# Patient Record
Sex: FEMALE | Race: WHITE | NOT HISPANIC OR LATINO | Employment: OTHER | URBAN - METROPOLITAN AREA
[De-identification: names, ages, dates, MRNs, and addresses within clinical notes are randomized per-mention and may not be internally consistent; named-entity substitution may affect disease eponyms.]

---

## 2023-04-25 ENCOUNTER — TELEPHONE (OUTPATIENT)
Dept: PRIMARY CARE | Facility: CLINIC | Age: 83
End: 2023-04-25
Payer: MEDICARE

## 2023-04-25 NOTE — TELEPHONE ENCOUNTER
Dr. Baird Pt. Called saying she would like to speak with you, she have some kind of illness. Pt. Contact number is [phone].

## 2023-05-01 PROBLEM — K63.5 COLON POLYP: Status: ACTIVE | Noted: 2023-05-01

## 2023-05-01 PROBLEM — H90.3 BILATERAL SENSORINEURAL HEARING LOSS: Status: ACTIVE | Noted: 2023-05-01

## 2023-05-01 PROBLEM — K80.20 CHOLELITHIASIS: Status: ACTIVE | Noted: 2023-05-01

## 2023-05-01 PROBLEM — H81.10 BENIGN POSITIONAL VERTIGO: Status: ACTIVE | Noted: 2023-05-01

## 2023-05-01 PROBLEM — L84 CALLUS: Status: ACTIVE | Noted: 2023-05-01

## 2023-05-01 PROBLEM — B35.1 ONYCHOMYCOSIS OF TOENAIL: Status: ACTIVE | Noted: 2023-05-01

## 2023-05-01 PROBLEM — S29.019A STRAIN OF THORACIC REGION: Status: ACTIVE | Noted: 2023-05-01

## 2023-05-01 PROBLEM — R73.03 PREDIABETES: Status: ACTIVE | Noted: 2023-05-01

## 2023-05-01 PROBLEM — S90.222A SUBUNGUAL HEMATOMA OF TOE OF LEFT FOOT: Status: ACTIVE | Noted: 2023-05-01

## 2023-05-01 PROBLEM — M62.81 MUSCLE WEAKNESS OF EXTREMITY: Status: ACTIVE | Noted: 2023-05-01

## 2023-05-01 PROBLEM — H91.91 HEARING LOSS IN RIGHT EAR: Status: ACTIVE | Noted: 2023-05-01

## 2023-05-01 PROBLEM — K57.90 DIVERTICULOSIS: Status: ACTIVE | Noted: 2023-05-01

## 2023-05-01 PROBLEM — I10 HYPERTENSION: Status: ACTIVE | Noted: 2023-05-01

## 2023-05-01 PROBLEM — L50.1 URTICARIA, IDIOPATHIC: Status: ACTIVE | Noted: 2023-05-01

## 2023-05-01 PROBLEM — G62.9 PERIPHERAL NEUROPATHY: Status: ACTIVE | Noted: 2023-05-01

## 2023-05-01 PROBLEM — G89.29 CHRONIC BACK PAIN: Status: ACTIVE | Noted: 2023-05-01

## 2023-05-01 PROBLEM — N95.1 VAGINAL DRYNESS, MENOPAUSAL: Status: ACTIVE | Noted: 2023-05-01

## 2023-05-01 PROBLEM — M20.62 DEFORMITY OF TOE OF LEFT FOOT: Status: ACTIVE | Noted: 2023-05-01

## 2023-05-01 PROBLEM — E03.9 HYPOTHYROIDISM: Status: ACTIVE | Noted: 2023-05-01

## 2023-05-01 PROBLEM — M54.9 CHRONIC BACK PAIN: Status: ACTIVE | Noted: 2023-05-01

## 2023-05-01 PROBLEM — M25.512 LEFT SHOULDER PAIN: Status: ACTIVE | Noted: 2023-05-01

## 2023-05-01 PROBLEM — E78.5 HYPERLIPIDEMIA: Status: ACTIVE | Noted: 2023-05-01

## 2023-05-01 PROBLEM — K82.8 GALLBLADDER SLUDGE: Status: ACTIVE | Noted: 2023-05-01

## 2023-05-01 PROBLEM — I25.10 ATHEROSCLEROTIC HEART DISEASE OF NATIVE CORONARY ARTERY WITHOUT ANGINA PECTORIS: Status: ACTIVE | Noted: 2023-05-01

## 2023-05-01 RX ORDER — AMLODIPINE BESYLATE 5 MG/1
TABLET ORAL
COMMUNITY
Start: 2022-10-31 | End: 2023-05-11 | Stop reason: ALTCHOICE

## 2023-05-01 RX ORDER — POTASSIUM CHLORIDE 1500 MG/1
1 TABLET, EXTENDED RELEASE ORAL DAILY
COMMUNITY
End: 2023-05-11 | Stop reason: ALTCHOICE

## 2023-05-01 RX ORDER — LOVASTATIN 40 MG/1
1 TABLET ORAL DAILY
COMMUNITY
Start: 2011-06-09 | End: 2023-11-14 | Stop reason: SDUPTHER

## 2023-05-01 RX ORDER — CETIRIZINE HYDROCHLORIDE 10 MG/1
1 TABLET ORAL DAILY
COMMUNITY
End: 2023-05-11 | Stop reason: ALTCHOICE

## 2023-05-01 RX ORDER — CHOLECALCIFEROL (VITAMIN D3) 25 MCG
1 TABLET ORAL DAILY
COMMUNITY
Start: 2016-12-14 | End: 2023-05-11 | Stop reason: ALTCHOICE

## 2023-05-01 RX ORDER — LYSINE HCL 500 MG
TABLET ORAL
COMMUNITY
Start: 2018-04-10 | End: 2023-05-11 | Stop reason: ALTCHOICE

## 2023-05-01 RX ORDER — POTASSIUM CHLORIDE 20 MEQ/1
1 TABLET, EXTENDED RELEASE ORAL DAILY
COMMUNITY
Start: 2016-11-23 | End: 2023-05-11 | Stop reason: ALTCHOICE

## 2023-05-01 RX ORDER — TRIAMCINOLONE ACETONIDE 1 MG/G
CREAM TOPICAL 2 TIMES DAILY PRN
COMMUNITY
Start: 2021-02-09

## 2023-05-01 RX ORDER — FAMOTIDINE 20 MG/1
TABLET, FILM COATED ORAL
COMMUNITY
Start: 2021-02-08 | End: 2023-05-11 | Stop reason: ALTCHOICE

## 2023-05-01 RX ORDER — ASCORBIC ACID 250 MG
1 TABLET ORAL DAILY
COMMUNITY
Start: 2017-07-07 | End: 2023-05-11 | Stop reason: ALTCHOICE

## 2023-05-01 RX ORDER — TRIAMTERENE AND HYDROCHLOROTHIAZIDE 37.5; 25 MG/1; MG/1
1 CAPSULE ORAL DAILY
COMMUNITY
Start: 2011-06-09 | End: 2023-05-11 | Stop reason: ALTCHOICE

## 2023-05-01 RX ORDER — CICLOPIROX 80 MG/ML
SOLUTION TOPICAL
COMMUNITY
Start: 2022-09-12 | End: 2023-11-27 | Stop reason: WASHOUT

## 2023-05-01 RX ORDER — LORATADINE 10 MG/1
1 TABLET ORAL DAILY
COMMUNITY
Start: 2021-02-08 | End: 2023-05-11 | Stop reason: ALTCHOICE

## 2023-05-01 RX ORDER — LEVOTHYROXINE SODIUM 100 UG/1
1 TABLET ORAL DAILY
COMMUNITY
Start: 2011-06-09 | End: 2023-11-14 | Stop reason: SDUPTHER

## 2023-05-01 RX ORDER — PROPRANOLOL HYDROCHLORIDE 40 MG/1
TABLET ORAL 4 TIMES DAILY
COMMUNITY
Start: 2012-05-16

## 2023-05-01 RX ORDER — BLOOD SUGAR DIAGNOSTIC
STRIP MISCELLANEOUS
COMMUNITY
Start: 2013-11-26 | End: 2023-05-11 | Stop reason: ALTCHOICE

## 2023-05-01 RX ORDER — METFORMIN HYDROCHLORIDE 500 MG/1
1500 TABLET, EXTENDED RELEASE ORAL
COMMUNITY
Start: 2017-09-27 | End: 2023-05-19 | Stop reason: SDUPTHER

## 2023-05-01 RX ORDER — BLOOD SUGAR DIAGNOSTIC
STRIP MISCELLANEOUS
COMMUNITY
Start: 2019-11-19

## 2023-05-01 RX ORDER — CONJUGATED ESTROGENS 0.62 MG/G
CREAM VAGINAL
COMMUNITY
Start: 2012-12-06 | End: 2024-02-08 | Stop reason: SDUPTHER

## 2023-05-11 ENCOUNTER — OFFICE VISIT (OUTPATIENT)
Dept: PRIMARY CARE | Facility: CLINIC | Age: 83
End: 2023-05-11
Payer: MEDICARE

## 2023-05-11 VITALS
WEIGHT: 159.4 LBS | BODY MASS INDEX: 29.33 KG/M2 | TEMPERATURE: 97.3 F | HEART RATE: 60 BPM | OXYGEN SATURATION: 97 % | HEIGHT: 62 IN | RESPIRATION RATE: 15 BRPM | DIASTOLIC BLOOD PRESSURE: 76 MMHG | SYSTOLIC BLOOD PRESSURE: 140 MMHG

## 2023-05-11 DIAGNOSIS — H81.10 BENIGN PAROXYSMAL POSITIONAL VERTIGO, UNSPECIFIED LATERALITY: ICD-10-CM

## 2023-05-11 DIAGNOSIS — I10 HYPERTENSION, UNSPECIFIED TYPE: ICD-10-CM

## 2023-05-11 DIAGNOSIS — M62.81 MUSCLE WEAKNESS OF EXTREMITY: ICD-10-CM

## 2023-05-11 DIAGNOSIS — E03.9 HYPOTHYROIDISM, UNSPECIFIED TYPE: ICD-10-CM

## 2023-05-11 DIAGNOSIS — R73.03 PREDIABETES: Primary | ICD-10-CM

## 2023-05-11 DIAGNOSIS — K80.20 CALCULUS OF GALLBLADDER WITHOUT CHOLECYSTITIS WITHOUT OBSTRUCTION: ICD-10-CM

## 2023-05-11 PROBLEM — G89.29 CHRONIC BACK PAIN: Status: RESOLVED | Noted: 2023-05-01 | Resolved: 2023-05-11

## 2023-05-11 PROBLEM — S29.019A STRAIN OF THORACIC REGION: Status: RESOLVED | Noted: 2023-05-01 | Resolved: 2023-05-11

## 2023-05-11 PROBLEM — M54.9 CHRONIC BACK PAIN: Status: RESOLVED | Noted: 2023-05-01 | Resolved: 2023-05-11

## 2023-05-11 PROBLEM — S90.222A SUBUNGUAL HEMATOMA OF TOE OF LEFT FOOT: Status: RESOLVED | Noted: 2023-05-01 | Resolved: 2023-05-11

## 2023-05-11 PROCEDURE — 80061 LIPID PANEL: CPT | Performed by: PEDIATRICS

## 2023-05-11 PROCEDURE — 3077F SYST BP >= 140 MM HG: CPT | Performed by: PEDIATRICS

## 2023-05-11 PROCEDURE — 83036 HEMOGLOBIN GLYCOSYLATED A1C: CPT | Performed by: PEDIATRICS

## 2023-05-11 PROCEDURE — 1159F MED LIST DOCD IN RCRD: CPT | Performed by: PEDIATRICS

## 2023-05-11 PROCEDURE — 1160F RVW MEDS BY RX/DR IN RCRD: CPT | Performed by: PEDIATRICS

## 2023-05-11 PROCEDURE — 99214 OFFICE O/P EST MOD 30 MIN: CPT | Performed by: PEDIATRICS

## 2023-05-11 PROCEDURE — 84443 ASSAY THYROID STIM HORMONE: CPT | Performed by: PEDIATRICS

## 2023-05-11 PROCEDURE — 3078F DIAST BP <80 MM HG: CPT | Performed by: PEDIATRICS

## 2023-05-11 PROCEDURE — 1036F TOBACCO NON-USER: CPT | Performed by: PEDIATRICS

## 2023-05-11 PROCEDURE — 80048 BASIC METABOLIC PNL TOTAL CA: CPT | Performed by: PEDIATRICS

## 2023-05-11 ASSESSMENT — PATIENT HEALTH QUESTIONNAIRE - PHQ9
1. LITTLE INTEREST OR PLEASURE IN DOING THINGS: NOT AT ALL
2. FEELING DOWN, DEPRESSED OR HOPELESS: NOT AT ALL
SUM OF ALL RESPONSES TO PHQ9 QUESTIONS 1 AND 2: 0

## 2023-05-11 ASSESSMENT — PAIN SCALES - GENERAL: PAINLEVEL: 0-NO PAIN

## 2023-05-11 NOTE — PROGRESS NOTES
"Subjective   Patient ID: Barbara Srinivasan is a 82 y.o. female who presents for Annual Exam.    HPI   Dexa normal in 2021; no more mammograms;  Patient has been feeling well; walks with her cane.  Review of Systems  No CP or SOB  Objective   /76 (BP Location: Left arm, Patient Position: Sitting, BP Cuff Size: Adult)   Pulse 60   Temp 36.3 °C (97.3 °F) (Temporal)   Resp 15   Ht 1.575 m (5' 2\")   Wt 72.3 kg (159 lb 6.4 oz)   SpO2 97%   BMI 29.15 kg/m²     Physical Exam  Vitals reviewed.   Constitutional:       General: She is not in acute distress.  HENT:      Head: Normocephalic.      Right Ear: Tympanic membrane normal.      Left Ear: Tympanic membrane normal.      Nose: Nose normal.      Mouth/Throat:      Pharynx: Oropharynx is clear.   Cardiovascular:      Rate and Rhythm: Normal rate and regular rhythm.      Heart sounds: Normal heart sounds.   Pulmonary:      Breath sounds: Normal breath sounds.   Abdominal:      Palpations: Abdomen is soft.      Tenderness: There is no abdominal tenderness.   Musculoskeletal:         General: No tenderness.      Comments: Walks with cane   Skin:     Findings: No rash.   Neurological:      General: No focal deficit present.      Mental Status: She is alert.   Psychiatric:         Mood and Affect: Mood normal.         Assessment/Plan   Problem List Items Addressed This Visit          Circulatory    Hypertension    Current Assessment & Plan     Well controlled on Propranolol            Digestive    Cholelithiasis    Current Assessment & Plan     No pain            Musculoskeletal    Muscle weakness of extremity    Relevant Orders    Disability Placard       Endocrine/Metabolic    Hypothyroidism    Relevant Orders    Lipid Panel    Thyroid Stimulating Hormone    Prediabetes - Primary    Current Assessment & Plan     Checks sugars weekly; readings are 123, 111, 112, 117, 118, 104, 103, 107, 101, 108, 103, 99, 115         Relevant Orders    Hemoglobin A1C    Basic Metabolic " Panel       Other    Benign positional vertigo    Current Assessment & Plan     Mild if she turns suddenly

## 2023-05-15 ENCOUNTER — TELEPHONE (OUTPATIENT)
Dept: PRIMARY CARE | Facility: CLINIC | Age: 83
End: 2023-05-15
Payer: MEDICARE

## 2023-05-15 DIAGNOSIS — R94.4 DECREASED GFR: Primary | ICD-10-CM

## 2023-05-15 NOTE — TELEPHONE ENCOUNTER
----- Message from Celina Baird MD sent at 5/15/2023  9:37 AM EDT -----  Her kidney tests were a bit off; needs to drink more water ( 8 glasses a day) and repeat in a week; Thyroid test is borderline but if she is feeling well on current dose, I won't change it.

## 2023-05-15 NOTE — TELEPHONE ENCOUNTER
Unable to reach patient  Barbara Srinivasan by phone left voice mail message for Patient to call [phone] to retrieve Dr. Baird message.

## 2023-05-19 DIAGNOSIS — R73.03 PREDIABETES: Primary | ICD-10-CM

## 2023-05-20 RX ORDER — METFORMIN HYDROCHLORIDE 500 MG/1
1500 TABLET, EXTENDED RELEASE ORAL
Qty: 90 TABLET | Refills: 1 | Status: SHIPPED | OUTPATIENT
Start: 2023-05-20 | End: 2023-07-29

## 2023-05-24 ENCOUNTER — TELEPHONE (OUTPATIENT)
Dept: PRIMARY CARE | Facility: CLINIC | Age: 83
End: 2023-05-24
Payer: MEDICARE

## 2023-07-27 DIAGNOSIS — R73.03 PREDIABETES: ICD-10-CM

## 2023-07-29 RX ORDER — METFORMIN HYDROCHLORIDE 500 MG/1
TABLET, EXTENDED RELEASE ORAL
Qty: 180 TABLET | Refills: 1 | Status: SHIPPED | OUTPATIENT
Start: 2023-07-29 | End: 2023-10-09

## 2023-08-08 ENCOUNTER — TELEPHONE (OUTPATIENT)
Dept: PRIMARY CARE | Facility: CLINIC | Age: 83
End: 2023-08-08
Payer: MEDICARE

## 2023-08-09 NOTE — TELEPHONE ENCOUNTER
Has been eating a lot of cherries; on recent CMP, GFR was fine but glucose went up to 120. Will back off of the cherries

## 2023-10-08 DIAGNOSIS — R73.03 PREDIABETES: ICD-10-CM

## 2023-10-09 RX ORDER — METFORMIN HYDROCHLORIDE 500 MG/1
TABLET, EXTENDED RELEASE ORAL
Qty: 270 TABLET | Refills: 3 | Status: SHIPPED | OUTPATIENT
Start: 2023-10-09 | End: 2024-02-08 | Stop reason: SDUPTHER

## 2023-11-14 ENCOUNTER — TELEPHONE (OUTPATIENT)
Dept: PRIMARY CARE | Facility: CLINIC | Age: 83
End: 2023-11-14
Payer: MEDICARE

## 2023-11-14 DIAGNOSIS — E03.9 HYPOTHYROIDISM, UNSPECIFIED TYPE: ICD-10-CM

## 2023-11-14 DIAGNOSIS — E78.5 HYPERLIPIDEMIA, UNSPECIFIED HYPERLIPIDEMIA TYPE: Primary | ICD-10-CM

## 2023-11-14 DIAGNOSIS — I10 HYPERTENSION, UNSPECIFIED TYPE: ICD-10-CM

## 2023-11-14 RX ORDER — LOVASTATIN 40 MG/1
40 TABLET ORAL DAILY
Qty: 90 TABLET | Refills: 1 | Status: SHIPPED | OUTPATIENT
Start: 2023-11-14 | End: 2024-02-08 | Stop reason: SDUPTHER

## 2023-11-14 RX ORDER — TRIAMTERENE AND HYDROCHLOROTHIAZIDE 37.5; 25 MG/1; MG/1
1 CAPSULE ORAL DAILY
Qty: 90 CAPSULE | Refills: 1 | Status: SHIPPED | OUTPATIENT
Start: 2023-11-14 | End: 2024-02-08 | Stop reason: SDUPTHER

## 2023-11-14 RX ORDER — POTASSIUM CHLORIDE 20 MEQ/1
20 TABLET, EXTENDED RELEASE ORAL DAILY
COMMUNITY
Start: 2023-08-03 | End: 2023-11-14 | Stop reason: SDUPTHER

## 2023-11-14 RX ORDER — LEVOTHYROXINE SODIUM 100 UG/1
100 TABLET ORAL DAILY
Qty: 90 TABLET | Refills: 1 | Status: SHIPPED | OUTPATIENT
Start: 2023-11-14 | End: 2024-02-08 | Stop reason: SDUPTHER

## 2023-11-14 RX ORDER — POTASSIUM CHLORIDE 20 MEQ/1
20 TABLET, EXTENDED RELEASE ORAL DAILY
Qty: 90 TABLET | Refills: 1 | Status: SHIPPED | OUTPATIENT
Start: 2023-11-14 | End: 2024-02-08 | Stop reason: SDUPTHER

## 2023-11-14 RX ORDER — TRIAMTERENE AND HYDROCHLOROTHIAZIDE 37.5; 25 MG/1; MG/1
1 CAPSULE ORAL DAILY
COMMUNITY
Start: 2023-08-03 | End: 2023-11-14 | Stop reason: SDUPTHER

## 2023-11-27 ENCOUNTER — OFFICE VISIT (OUTPATIENT)
Dept: PRIMARY CARE | Facility: CLINIC | Age: 83
End: 2023-11-27
Payer: MEDICARE

## 2023-11-27 VITALS
BODY MASS INDEX: 30 KG/M2 | TEMPERATURE: 98 F | WEIGHT: 164 LBS | SYSTOLIC BLOOD PRESSURE: 146 MMHG | HEART RATE: 63 BPM | DIASTOLIC BLOOD PRESSURE: 71 MMHG

## 2023-11-27 DIAGNOSIS — B35.1 ONYCHOMYCOSIS OF TOENAIL: ICD-10-CM

## 2023-11-27 DIAGNOSIS — I10 HYPERTENSION, UNSPECIFIED TYPE: ICD-10-CM

## 2023-11-27 DIAGNOSIS — K80.20 CALCULUS OF GALLBLADDER WITHOUT CHOLECYSTITIS WITHOUT OBSTRUCTION: ICD-10-CM

## 2023-11-27 DIAGNOSIS — R73.03 PREDIABETES: ICD-10-CM

## 2023-11-27 DIAGNOSIS — Z00.00 MEDICARE ANNUAL WELLNESS VISIT, SUBSEQUENT: Primary | ICD-10-CM

## 2023-11-27 DIAGNOSIS — G89.29 CHRONIC LEFT SHOULDER PAIN: ICD-10-CM

## 2023-11-27 DIAGNOSIS — E03.9 HYPOTHYROIDISM, UNSPECIFIED TYPE: ICD-10-CM

## 2023-11-27 DIAGNOSIS — M25.512 CHRONIC LEFT SHOULDER PAIN: ICD-10-CM

## 2023-11-27 DIAGNOSIS — G62.9 PERIPHERAL POLYNEUROPATHY: ICD-10-CM

## 2023-11-27 DIAGNOSIS — I25.10 ATHEROSCLEROSIS OF NATIVE CORONARY ARTERY OF NATIVE HEART WITHOUT ANGINA PECTORIS: ICD-10-CM

## 2023-11-27 DIAGNOSIS — L50.1 URTICARIA, IDIOPATHIC: ICD-10-CM

## 2023-11-27 DIAGNOSIS — E78.5 HYPERLIPIDEMIA, UNSPECIFIED HYPERLIPIDEMIA TYPE: ICD-10-CM

## 2023-11-27 DIAGNOSIS — N95.1 VAGINAL DRYNESS, MENOPAUSAL: ICD-10-CM

## 2023-11-27 PROBLEM — M62.81 MUSCLE WEAKNESS OF EXTREMITY: Status: RESOLVED | Noted: 2023-05-01 | Resolved: 2023-11-27

## 2023-11-27 PROCEDURE — 3078F DIAST BP <80 MM HG: CPT | Performed by: PEDIATRICS

## 2023-11-27 PROCEDURE — 1170F FXNL STATUS ASSESSED: CPT | Performed by: PEDIATRICS

## 2023-11-27 PROCEDURE — 99213 OFFICE O/P EST LOW 20 MIN: CPT | Performed by: PEDIATRICS

## 2023-11-27 PROCEDURE — G0439 PPPS, SUBSEQ VISIT: HCPCS | Performed by: PEDIATRICS

## 2023-11-27 PROCEDURE — 1159F MED LIST DOCD IN RCRD: CPT | Performed by: PEDIATRICS

## 2023-11-27 PROCEDURE — 3077F SYST BP >= 140 MM HG: CPT | Performed by: PEDIATRICS

## 2023-11-27 PROCEDURE — 1126F AMNT PAIN NOTED NONE PRSNT: CPT | Performed by: PEDIATRICS

## 2023-11-27 PROCEDURE — 1160F RVW MEDS BY RX/DR IN RCRD: CPT | Performed by: PEDIATRICS

## 2023-11-27 PROCEDURE — 1036F TOBACCO NON-USER: CPT | Performed by: PEDIATRICS

## 2023-11-27 RX ORDER — AMLODIPINE BESYLATE 5 MG/1
5 TABLET ORAL DAILY
COMMUNITY
End: 2024-05-28 | Stop reason: ALTCHOICE

## 2023-11-27 RX ORDER — LORATADINE 10 MG/1
10 TABLET ORAL DAILY
COMMUNITY

## 2023-11-27 RX ORDER — CICLOPIROX 80 MG/ML
SOLUTION TOPICAL
Qty: 6.6 ML | Refills: 2 | OUTPATIENT
Start: 2023-11-27 | End: 2024-02-08 | Stop reason: SDUPTHER

## 2023-11-27 RX ORDER — FAMOTIDINE 10 MG/1
10 TABLET ORAL DAILY
COMMUNITY

## 2023-11-27 RX ORDER — CALCIUM CARBONATE/VITAMIN D3 600 MG-20
TABLET,CHEWABLE ORAL
COMMUNITY

## 2023-11-27 ASSESSMENT — ACTIVITIES OF DAILY LIVING (ADL)
DOING_HOUSEWORK: INDEPENDENT
MANAGING_FINANCES: INDEPENDENT
DRESSING: INDEPENDENT
BATHING: INDEPENDENT
GROCERY_SHOPPING: INDEPENDENT
TAKING_MEDICATION: INDEPENDENT

## 2023-11-27 ASSESSMENT — PATIENT HEALTH QUESTIONNAIRE - PHQ9
2. FEELING DOWN, DEPRESSED OR HOPELESS: NOT AT ALL
1. LITTLE INTEREST OR PLEASURE IN DOING THINGS: NOT AT ALL
SUM OF ALL RESPONSES TO PHQ9 QUESTIONS 1 AND 2: 0

## 2023-11-27 NOTE — PROGRESS NOTES
Subjective   Patient ID: Barbara Srinivasan is a 83 y.o. female who presents for Medicare Wellness    HPI     Sugars in morning weekly: 92, 110, 96, 120, 100, 105, 106, 105, 111, 104, 115, 119, 103, 112, 110  BP at home weekly: 128/63, 122/56, 135/64, 125/60 130/64, 124/47, 120/52    Dexa normal in 2021; no more mammograms      Review of Systems    Objective   /71   Pulse 63   Temp 36.7 °C (98 °F)   Wt 74.4 kg (164 lb)   BMI 30.00 kg/m²     Physical Exam  Lungs clear  Heart RRR  No edema  Assessment/Plan   Problem List Items Addressed This Visit             ICD-10-CM    Atherosclerotic heart disease of native coronary artery without angina pectoris I25.10     Follows with Dr. Grayson         Relevant Medications    amLODIPine (Norvasc) 5 mg tablet    Cholelithiasis K80.20     Asymptomatic          Hyperlipidemia E78.5     Lipids in August showed triglyceride of 179 otherwise wnl, continue lovastatin 40 mg         Hypertension I10     Well controlled at home, continue amlodipine 5 mg, propranolol 40 mg and dyazide 37.5-25 mg         Hypothyroidism E03.9     Borderline in may, feels she has good energy. Continue 100 synthroid          Left shoulder pain M25.512     Mild, manages with tylenol as needed         Onychomycosis of toenail B35.1     Getting better with penlac         Relevant Medications    ciclopirox (Penlac) 8 % solution    Peripheral neuropathy G62.9     asymptomatic         Prediabetes R73.03     A1c 5.7 in may, continue metformin         RESOLVED: Urticaria, idiopathic L50.1    Vaginal dryness, menopausal N95.1     Continue topical          Other Visit Diagnoses         Codes    Medicare annual wellness visit, subsequent    -  Primary Z00.00

## 2023-11-30 ENCOUNTER — CLINICAL SUPPORT (OUTPATIENT)
Dept: PRIMARY CARE | Facility: CLINIC | Age: 83
End: 2023-11-30
Payer: MEDICARE

## 2023-11-30 PROCEDURE — G0008 ADMIN INFLUENZA VIRUS VAC: HCPCS | Performed by: PEDIATRICS

## 2023-11-30 PROCEDURE — 90662 IIV NO PRSV INCREASED AG IM: CPT | Performed by: PEDIATRICS

## 2023-12-13 ENCOUNTER — TELEPHONE (OUTPATIENT)
Dept: PRIMARY CARE | Facility: CLINIC | Age: 83
End: 2023-12-13
Payer: MEDICARE

## 2023-12-18 ENCOUNTER — TELEPHONE (OUTPATIENT)
Dept: PRIMARY CARE | Facility: CLINIC | Age: 83
End: 2023-12-18
Payer: MEDICARE

## 2023-12-19 ENCOUNTER — OFFICE VISIT (OUTPATIENT)
Dept: PRIMARY CARE | Facility: CLINIC | Age: 83
End: 2023-12-19
Payer: MEDICARE

## 2023-12-19 VITALS
WEIGHT: 161 LBS | DIASTOLIC BLOOD PRESSURE: 69 MMHG | SYSTOLIC BLOOD PRESSURE: 135 MMHG | BODY MASS INDEX: 29.45 KG/M2 | HEART RATE: 67 BPM | TEMPERATURE: 97.1 F

## 2023-12-19 DIAGNOSIS — R09.82 POSTNASAL DRIP: Primary | ICD-10-CM

## 2023-12-19 DIAGNOSIS — J01.90 ACUTE NON-RECURRENT SINUSITIS, UNSPECIFIED LOCATION: ICD-10-CM

## 2023-12-19 PROBLEM — J32.9 SINUSITIS: Status: ACTIVE | Noted: 2023-12-19

## 2023-12-19 PROCEDURE — 1126F AMNT PAIN NOTED NONE PRSNT: CPT | Performed by: PEDIATRICS

## 2023-12-19 PROCEDURE — 99213 OFFICE O/P EST LOW 20 MIN: CPT | Performed by: PEDIATRICS

## 2023-12-19 PROCEDURE — 1159F MED LIST DOCD IN RCRD: CPT | Performed by: PEDIATRICS

## 2023-12-19 PROCEDURE — 1160F RVW MEDS BY RX/DR IN RCRD: CPT | Performed by: PEDIATRICS

## 2023-12-19 PROCEDURE — 3075F SYST BP GE 130 - 139MM HG: CPT | Performed by: PEDIATRICS

## 2023-12-19 PROCEDURE — 3078F DIAST BP <80 MM HG: CPT | Performed by: PEDIATRICS

## 2023-12-19 PROCEDURE — 1036F TOBACCO NON-USER: CPT | Performed by: PEDIATRICS

## 2023-12-19 RX ORDER — AMOXICILLIN 875 MG/1
875 TABLET, FILM COATED ORAL 2 TIMES DAILY
Qty: 20 TABLET | Refills: 0 | Status: SHIPPED | OUTPATIENT
Start: 2023-12-19 | End: 2023-12-29

## 2023-12-19 NOTE — PROGRESS NOTES
Subjective   Patient ID: Barbara Srinivasan is a 83 y.o. female who presents for cough    HPI   Patient has had cough for 2 weeks at night productive of phlegm; does not know what color.   Review of Systems    Objective   /69 (BP Location: Left arm)   Pulse 67   Temp 36.2 °C (97.1 °F)   Wt 73 kg (161 lb)   BMI 29.45 kg/m²     Physical Exam  Nares red  No adenopathy  Lungs clear  Heart RRR  Assessment/Plan   Problem List Items Addressed This Visit             ICD-10-CM    Postnasal drip - Primary R09.82    Sinusitis J32.9    Relevant Medications    amoxicillin (Amoxil) 875 mg tablet    dextromethorphan-guaifenesin (Mucinex DM)  mg 12 hr tablet

## 2024-02-08 ENCOUNTER — OFFICE VISIT (OUTPATIENT)
Dept: PRIMARY CARE | Facility: CLINIC | Age: 84
End: 2024-02-08
Payer: MEDICARE

## 2024-02-08 VITALS
SYSTOLIC BLOOD PRESSURE: 130 MMHG | BODY MASS INDEX: 29.08 KG/M2 | DIASTOLIC BLOOD PRESSURE: 68 MMHG | HEART RATE: 70 BPM | OXYGEN SATURATION: 96 % | TEMPERATURE: 98 F | WEIGHT: 159 LBS

## 2024-02-08 DIAGNOSIS — M25.512 CHRONIC LEFT SHOULDER PAIN: ICD-10-CM

## 2024-02-08 DIAGNOSIS — I10 HYPERTENSION, UNSPECIFIED TYPE: ICD-10-CM

## 2024-02-08 DIAGNOSIS — B35.1 ONYCHOMYCOSIS OF TOENAIL: ICD-10-CM

## 2024-02-08 DIAGNOSIS — R09.82 POSTNASAL DRIP: ICD-10-CM

## 2024-02-08 DIAGNOSIS — E03.9 HYPOTHYROIDISM, UNSPECIFIED TYPE: ICD-10-CM

## 2024-02-08 DIAGNOSIS — N95.1 VAGINAL DRYNESS, MENOPAUSAL: ICD-10-CM

## 2024-02-08 DIAGNOSIS — K80.20 CALCULUS OF GALLBLADDER WITHOUT CHOLECYSTITIS WITHOUT OBSTRUCTION: ICD-10-CM

## 2024-02-08 DIAGNOSIS — E78.5 HYPERLIPIDEMIA, UNSPECIFIED HYPERLIPIDEMIA TYPE: Primary | ICD-10-CM

## 2024-02-08 DIAGNOSIS — N95.2 ATROPHIC VAGINITIS: ICD-10-CM

## 2024-02-08 DIAGNOSIS — R73.03 PREDIABETES: ICD-10-CM

## 2024-02-08 DIAGNOSIS — I25.10 ATHEROSCLEROSIS OF NATIVE CORONARY ARTERY OF NATIVE HEART WITHOUT ANGINA PECTORIS: ICD-10-CM

## 2024-02-08 DIAGNOSIS — G89.29 CHRONIC LEFT SHOULDER PAIN: ICD-10-CM

## 2024-02-08 DIAGNOSIS — H81.10 BENIGN PAROXYSMAL POSITIONAL VERTIGO, UNSPECIFIED LATERALITY: ICD-10-CM

## 2024-02-08 PROCEDURE — 1126F AMNT PAIN NOTED NONE PRSNT: CPT | Performed by: PEDIATRICS

## 2024-02-08 PROCEDURE — 1036F TOBACCO NON-USER: CPT | Performed by: PEDIATRICS

## 2024-02-08 PROCEDURE — 99212 OFFICE O/P EST SF 10 MIN: CPT | Performed by: PEDIATRICS

## 2024-02-08 PROCEDURE — 3078F DIAST BP <80 MM HG: CPT | Performed by: PEDIATRICS

## 2024-02-08 PROCEDURE — 3075F SYST BP GE 130 - 139MM HG: CPT | Performed by: PEDIATRICS

## 2024-02-08 RX ORDER — TRIAMTERENE AND HYDROCHLOROTHIAZIDE 37.5; 25 MG/1; MG/1
1 CAPSULE ORAL DAILY
Qty: 90 CAPSULE | Refills: 2 | Status: SHIPPED | OUTPATIENT
Start: 2024-02-08

## 2024-02-08 RX ORDER — CONJUGATED ESTROGENS 0.62 MG/G
CREAM VAGINAL
Qty: 30 G | Refills: 11 | Status: SHIPPED | OUTPATIENT
Start: 2024-02-08

## 2024-02-08 RX ORDER — LOVASTATIN 40 MG/1
40 TABLET ORAL DAILY
Qty: 90 TABLET | Refills: 2 | Status: SHIPPED | OUTPATIENT
Start: 2024-02-08

## 2024-02-08 RX ORDER — CICLOPIROX 80 MG/ML
SOLUTION TOPICAL
Qty: 6.6 ML | Refills: 11 | Status: SHIPPED | OUTPATIENT
Start: 2024-02-08 | End: 2024-05-31 | Stop reason: ALTCHOICE

## 2024-02-08 RX ORDER — METFORMIN HYDROCHLORIDE 500 MG/1
TABLET, EXTENDED RELEASE ORAL
Qty: 270 TABLET | Refills: 2 | Status: SHIPPED | OUTPATIENT
Start: 2024-02-08

## 2024-02-08 RX ORDER — LEVOTHYROXINE SODIUM 100 UG/1
100 TABLET ORAL DAILY
Qty: 90 TABLET | Refills: 2 | Status: SHIPPED | OUTPATIENT
Start: 2024-02-08

## 2024-02-08 RX ORDER — POTASSIUM CHLORIDE 20 MEQ/1
20 TABLET, EXTENDED RELEASE ORAL DAILY
Qty: 90 TABLET | Refills: 2 | Status: SHIPPED | OUTPATIENT
Start: 2024-02-08

## 2024-02-08 NOTE — ASSESSMENT & PLAN NOTE
Checks Home Bps but not sure what they have been  Well controlled at home, continue amlodipine 5 mg, propranolol 40 mg and Dyazide 37.5-25 mg

## 2024-02-08 NOTE — PROGRESS NOTES
Subjective   Patient ID: Barbara Srinivasan is a 83 y.o. female who presents for No chief complaint on file..    HPI   Bone Density done July 2021 as normal.   Review of Systems    Objective   /68   Pulse 70   Temp 36.7 °C (98 °F)   Wt 72.1 kg (159 lb)   SpO2 96%   BMI 29.08 kg/m²     Physical Exam  Lungs clear  Heart RRR  Assessment/Plan   Problem List Items Addressed This Visit             ICD-10-CM    Atherosclerotic heart disease of native coronary artery without angina pectoris I25.10     Follows with Dr. Grayson         Benign positional vertigo H81.10     Walks with Cane which helps with balance         Cholelithiasis K80.20     Is Asymptomatic         Hyperlipidemia - Primary E78.5     Lipids in August showed triglyceride of 179 otherwise wnl, continue lovastatin 40 mg          Relevant Medications    lovastatin (Mevacor) 40 mg tablet    Hypertension I10     Checks Home Bps but not sure what they have been  Well controlled at home, continue amlodipine 5 mg, propranolol 40 mg and Dyazide 37.5-25 mg          Relevant Medications    triamterene-hydrochlorothiazid (Dyazide) 37.5-25 mg capsule    potassium chloride CR 20 mEq ER tablet    Hypothyroidism E03.9     Borderline in may, feels she has good energy. Continue 100 synthroid           Relevant Medications    levothyroxine (Synthroid, Levoxyl) 100 mcg tablet    Left shoulder pain M25.512     Tylenol helps with the pain         Onychomycosis of toenail B35.1    Relevant Medications    ciclopirox (Penlac) 8 % solution    Prediabetes R73.03     Checks home sugars but not sure what they have been  A1c 5.7 in may, continue metformin          Relevant Medications    metFORMIN  mg 24 hr tablet    Vaginal dryness, menopausal N95.1     Uses Estrogen cream         Postnasal drip R09.82     Takes OTC allergy mediciation  Well managed          Other Visit Diagnoses         Codes    Atrophic vaginitis     N95.2    Relevant Medications    Premarin vaginal cream

## 2024-05-20 ENCOUNTER — APPOINTMENT (OUTPATIENT)
Dept: PRIMARY CARE | Facility: CLINIC | Age: 84
End: 2024-05-20
Payer: MEDICARE

## 2024-05-28 ENCOUNTER — OFFICE VISIT (OUTPATIENT)
Dept: PRIMARY CARE | Facility: CLINIC | Age: 84
End: 2024-05-28
Payer: MEDICARE

## 2024-05-28 ENCOUNTER — APPOINTMENT (OUTPATIENT)
Dept: PRIMARY CARE | Facility: CLINIC | Age: 84
End: 2024-05-28
Payer: MEDICARE

## 2024-05-28 VITALS
WEIGHT: 148 LBS | SYSTOLIC BLOOD PRESSURE: 127 MMHG | OXYGEN SATURATION: 97 % | BODY MASS INDEX: 27.23 KG/M2 | RESPIRATION RATE: 16 BRPM | HEART RATE: 64 BPM | TEMPERATURE: 97.1 F | DIASTOLIC BLOOD PRESSURE: 66 MMHG | HEIGHT: 62 IN

## 2024-05-28 DIAGNOSIS — R77.8 ELEVATED TOTAL PROTEIN: ICD-10-CM

## 2024-05-28 DIAGNOSIS — E03.9 HYPOTHYROIDISM, UNSPECIFIED TYPE: ICD-10-CM

## 2024-05-28 DIAGNOSIS — K82.8 GALLBLADDER SLUDGE: ICD-10-CM

## 2024-05-28 DIAGNOSIS — I25.10 ATHEROSCLEROSIS OF NATIVE CORONARY ARTERY OF NATIVE HEART WITHOUT ANGINA PECTORIS: ICD-10-CM

## 2024-05-28 DIAGNOSIS — I10 HYPERTENSION, UNSPECIFIED TYPE: Primary | ICD-10-CM

## 2024-05-28 DIAGNOSIS — E78.5 HYPERLIPIDEMIA, UNSPECIFIED HYPERLIPIDEMIA TYPE: ICD-10-CM

## 2024-05-28 DIAGNOSIS — R73.03 PREDIABETES: ICD-10-CM

## 2024-05-28 PROBLEM — M25.512 LEFT SHOULDER PAIN: Status: RESOLVED | Noted: 2023-05-01 | Resolved: 2024-05-28

## 2024-05-28 PROBLEM — G62.9 PERIPHERAL NEUROPATHY: Status: RESOLVED | Noted: 2023-05-01 | Resolved: 2024-05-28

## 2024-05-28 PROBLEM — J32.9 SINUSITIS: Status: RESOLVED | Noted: 2023-12-19 | Resolved: 2024-05-28

## 2024-05-28 PROCEDURE — 1126F AMNT PAIN NOTED NONE PRSNT: CPT | Performed by: PEDIATRICS

## 2024-05-28 PROCEDURE — 80061 LIPID PANEL: CPT | Performed by: PEDIATRICS

## 2024-05-28 PROCEDURE — 83036 HEMOGLOBIN GLYCOSYLATED A1C: CPT | Performed by: PEDIATRICS

## 2024-05-28 PROCEDURE — G0009 ADMIN PNEUMOCOCCAL VACCINE: HCPCS | Performed by: PEDIATRICS

## 2024-05-28 PROCEDURE — 3074F SYST BP LT 130 MM HG: CPT | Performed by: PEDIATRICS

## 2024-05-28 PROCEDURE — 80053 COMPREHEN METABOLIC PANEL: CPT | Performed by: PEDIATRICS

## 2024-05-28 PROCEDURE — 1159F MED LIST DOCD IN RCRD: CPT | Performed by: PEDIATRICS

## 2024-05-28 PROCEDURE — 3078F DIAST BP <80 MM HG: CPT | Performed by: PEDIATRICS

## 2024-05-28 PROCEDURE — 90677 PCV20 VACCINE IM: CPT | Performed by: PEDIATRICS

## 2024-05-28 PROCEDURE — 99214 OFFICE O/P EST MOD 30 MIN: CPT | Performed by: PEDIATRICS

## 2024-05-28 ASSESSMENT — PAIN SCALES - GENERAL: PAINLEVEL: 0-NO PAIN

## 2024-05-28 ASSESSMENT — PATIENT HEALTH QUESTIONNAIRE - PHQ9
SUM OF ALL RESPONSES TO PHQ9 QUESTIONS 1 AND 2: 0
2. FEELING DOWN, DEPRESSED OR HOPELESS: NOT AT ALL
1. LITTLE INTEREST OR PLEASURE IN DOING THINGS: NOT AT ALL

## 2024-05-28 NOTE — PROGRESS NOTES
"Subjective   Patient ID: Barbara Srinivasan is a 83 y.o. female who presents for Hypothyroidism, Hypertension, Hyperlipidemia, Prediabetes, and Follow-up.    HPI   Patient stopped Amlodipine because she noted she was losing hair.   BP is doing great off Amlodipine.  Dexa due 2026  Review of Systems    Objective   /66 (BP Location: Left arm, Patient Position: Sitting, BP Cuff Size: Adult)   Pulse 64   Temp 36.2 °C (97.1 °F) (Temporal)   Resp 16   Ht 1.575 m (5' 2\")   Wt 67.1 kg (148 lb)   SpO2 97%   BMI 27.07 kg/m²     Physical Exam  Ear wax bilaterally  Lungs clear  Heart RRR  Abd soft  Assessment/Plan   Problem List Items Addressed This Visit             ICD-10-CM    Atherosclerotic heart disease of native coronary artery without angina pectoris I25.10     Saw Dr Grayson in winter         Gallbladder sludge K82.8     No pain         Hyperlipidemia E78.5     Cholesterol was 135 last summer         Relevant Orders    Comprehensive Metabolic Panel    Lipid Panel    Hypertension - Primary I10    Hypothyroidism E03.9    Prediabetes R73.03    Relevant Orders    Hemoglobin A1C          "

## 2024-06-01 PROBLEM — R77.8 ELEVATED TOTAL PROTEIN: Status: ACTIVE | Noted: 2024-06-01

## 2024-06-03 PROCEDURE — 36415 COLL VENOUS BLD VENIPUNCTURE: CPT

## 2024-06-03 PROCEDURE — 84165 PROTEIN E-PHORESIS SERUM: CPT

## 2024-06-03 PROCEDURE — 84155 ASSAY OF PROTEIN SERUM: CPT | Performed by: PEDIATRICS

## 2024-06-04 LAB — PROT SERPL-MCNC: 8 G/DL (ref 6.4–8.2)

## 2024-06-05 LAB
ALBUMIN: 4.4 G/DL (ref 3.4–5)
ALPHA 1 GLOBULIN: 0.3 G/DL (ref 0.2–0.6)
ALPHA 2 GLOBULIN: 1 G/DL (ref 0.4–1.1)
BETA GLOBULIN: 0.9 G/DL (ref 0.5–1.2)
GAMMA GLOBULIN: 1.5 G/DL (ref 0.5–1.4)
PATH REVIEW-SERUM PROTEIN ELECTROPHORESIS: ABNORMAL
PROTEIN ELECTROPHORESIS COMMENT: ABNORMAL

## 2024-06-07 DIAGNOSIS — R94.4 DECREASED GFR: Primary | ICD-10-CM

## 2024-06-11 ENCOUNTER — TELEPHONE (OUTPATIENT)
Dept: PRIMARY CARE | Facility: CLINIC | Age: 84
End: 2024-06-11
Payer: MEDICARE

## 2024-06-11 NOTE — TELEPHONE ENCOUNTER
----- Message from Celina Baird MD sent at 6/7/2024  3:43 PM EDT -----  A1C 5.7 but sugar is in diabetic range of 126; can't call it diabetes unless there are 2 episodes of sugars of 126 or more; Cholesterol 134 which is good; kidney test down at 48.75. So she needs to do a urine test; orders placed in computer; Is she drinking lots of water? Have her drink 6 to 8 glasses of water daily  and repeat sugar and kidney function tests at a  lab in a week along with the urine test. She needs to go before breakfast.

## 2024-06-11 NOTE — TELEPHONE ENCOUNTER
Unable to reach patient by phone left voice mail message for Patient to call [phone] to retrieve Dr. Baird message.

## 2024-06-12 ENCOUNTER — TELEPHONE (OUTPATIENT)
Dept: PRIMARY CARE | Facility: CLINIC | Age: 84
End: 2024-06-12
Payer: MEDICARE

## 2024-06-12 NOTE — TELEPHONE ENCOUNTER
Pt called requesting a call back to discuss the message left for her yesterday. She stated she did not know why she was called and she is having trouble understanding what the message means. She stated she can be reached at [phone].

## 2024-06-12 NOTE — TELEPHONE ENCOUNTER
Advised drinking 8 glasses of water daily and go to [facility] to get blood and urine tests  done.

## 2024-06-17 ENCOUNTER — LAB (OUTPATIENT)
Dept: LAB | Facility: LAB | Age: 84
End: 2024-06-17
Payer: MEDICARE

## 2024-06-17 DIAGNOSIS — R94.4 DECREASED GFR: ICD-10-CM

## 2024-06-17 LAB
ANION GAP SERPL CALC-SCNC: 15 MMOL/L
BUN SERPL-MCNC: 17 MG/DL (ref 8–25)
CALCIUM SERPL-MCNC: 10.2 MG/DL (ref 8.5–10.4)
CHLORIDE SERPL-SCNC: 97 MMOL/L (ref 97–107)
CO2 SERPL-SCNC: 26 MMOL/L (ref 24–31)
CREAT SERPL-MCNC: 0.9 MG/DL (ref 0.4–1.6)
CREAT UR-MCNC: 108.1 MG/DL
CREAT UR-MCNC: 108.1 MG/DL
EGFRCR SERPLBLD CKD-EPI 2021: 63 ML/MIN/1.73M*2
GLUCOSE SERPL-MCNC: 114 MG/DL (ref 65–99)
MICROALBUMIN UR-MCNC: 83 MG/L (ref 0–23)
MICROALBUMIN/CREAT UR: 76.8 UG/MG CREAT
POTASSIUM SERPL-SCNC: 4.1 MMOL/L (ref 3.4–5.1)
PROT UR-MCNC: 36 MG/DL
PROT/CREAT UR: 0.33 MG/MG CREAT
SODIUM SERPL-SCNC: 138 MMOL/L (ref 133–145)

## 2024-06-17 PROCEDURE — 82043 UR ALBUMIN QUANTITATIVE: CPT | Performed by: PEDIATRICS

## 2024-06-17 PROCEDURE — 84156 ASSAY OF PROTEIN URINE: CPT | Performed by: PEDIATRICS

## 2024-06-17 PROCEDURE — 80048 BASIC METABOLIC PNL TOTAL CA: CPT

## 2024-06-17 PROCEDURE — 36415 COLL VENOUS BLD VENIPUNCTURE: CPT

## 2024-06-20 ENCOUNTER — OFFICE VISIT (OUTPATIENT)
Dept: PRIMARY CARE | Facility: CLINIC | Age: 84
End: 2024-06-20
Payer: MEDICARE

## 2024-06-20 VITALS
OXYGEN SATURATION: 97 % | SYSTOLIC BLOOD PRESSURE: 133 MMHG | BODY MASS INDEX: 27.44 KG/M2 | WEIGHT: 150 LBS | HEART RATE: 54 BPM | DIASTOLIC BLOOD PRESSURE: 70 MMHG

## 2024-06-20 DIAGNOSIS — B37.9 MONILIA INFECTION: Primary | ICD-10-CM

## 2024-06-20 PROCEDURE — 3078F DIAST BP <80 MM HG: CPT | Performed by: PEDIATRICS

## 2024-06-20 PROCEDURE — 99212 OFFICE O/P EST SF 10 MIN: CPT | Performed by: PEDIATRICS

## 2024-06-20 PROCEDURE — 3075F SYST BP GE 130 - 139MM HG: CPT | Performed by: PEDIATRICS

## 2024-06-20 RX ORDER — NYSTATIN 100000 U/G
CREAM TOPICAL 2 TIMES DAILY
Qty: 30 G | Refills: 11 | Status: SHIPPED | OUTPATIENT
Start: 2024-06-20 | End: 2024-07-20

## 2024-06-20 NOTE — PROGRESS NOTES
Subjective   Patient ID: Barbara Srinivasan is a 83 y.o. female who presents for itching    HPI   Pt developed vulvar itching a week ago. 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

## 2024-08-26 ENCOUNTER — APPOINTMENT (OUTPATIENT)
Dept: PRIMARY CARE | Facility: CLINIC | Age: 84
End: 2024-08-26
Payer: MEDICARE

## 2024-08-28 ENCOUNTER — APPOINTMENT (OUTPATIENT)
Dept: PRIMARY CARE | Facility: CLINIC | Age: 84
End: 2024-08-28
Payer: MEDICARE

## 2024-09-03 ENCOUNTER — APPOINTMENT (OUTPATIENT)
Dept: PRIMARY CARE | Facility: CLINIC | Age: 84
End: 2024-09-03
Payer: MEDICARE

## 2024-09-09 ENCOUNTER — APPOINTMENT (OUTPATIENT)
Dept: PRIMARY CARE | Facility: CLINIC | Age: 84
End: 2024-09-09
Payer: MEDICARE

## 2024-09-09 VITALS
OXYGEN SATURATION: 91 % | SYSTOLIC BLOOD PRESSURE: 121 MMHG | DIASTOLIC BLOOD PRESSURE: 74 MMHG | WEIGHT: 150.4 LBS | BODY MASS INDEX: 27.51 KG/M2 | HEART RATE: 68 BPM | TEMPERATURE: 98.1 F

## 2024-09-09 DIAGNOSIS — B37.9 MONILIA INFECTION: Primary | ICD-10-CM

## 2024-09-09 PROCEDURE — 99212 OFFICE O/P EST SF 10 MIN: CPT | Performed by: PEDIATRICS

## 2024-09-09 PROCEDURE — 3078F DIAST BP <80 MM HG: CPT | Performed by: PEDIATRICS

## 2024-09-09 PROCEDURE — 3074F SYST BP LT 130 MM HG: CPT | Performed by: PEDIATRICS

## 2024-09-09 PROCEDURE — G0008 ADMIN INFLUENZA VIRUS VAC: HCPCS | Performed by: PEDIATRICS

## 2024-09-09 PROCEDURE — 90662 IIV NO PRSV INCREASED AG IM: CPT | Performed by: PEDIATRICS

## 2024-09-09 PROCEDURE — 1159F MED LIST DOCD IN RCRD: CPT | Performed by: PEDIATRICS

## 2024-09-09 RX ORDER — NYSTATIN 100000 U/G
CREAM TOPICAL
COMMUNITY
Start: 2024-09-01

## 2024-09-09 ASSESSMENT — ENCOUNTER SYMPTOMS
LOSS OF SENSATION IN FEET: 0
DEPRESSION: 0
OCCASIONAL FEELINGS OF UNSTEADINESS: 0

## 2024-09-09 NOTE — PROGRESS NOTES
Subjective   Patient ID: Barbara Srinivasan is a 84 y.o. female who presents for Follow-up (Vaginal issue getting better, used nystatin crm).    HPI   Patient developed vaginal itching last week, self-treated with nystatin cream with resolution.    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

## 2024-09-24 DIAGNOSIS — E03.9 HYPOTHYROIDISM, UNSPECIFIED TYPE: ICD-10-CM

## 2024-09-25 DIAGNOSIS — E03.9 HYPOTHYROIDISM, UNSPECIFIED TYPE: Primary | ICD-10-CM

## 2024-09-25 RX ORDER — LEVOTHYROXINE SODIUM 100 UG/1
100 TABLET ORAL DAILY
Qty: 90 TABLET | Refills: 0 | Status: SHIPPED | OUTPATIENT
Start: 2024-09-25

## 2024-10-09 DIAGNOSIS — I10 HYPERTENSION, UNSPECIFIED TYPE: ICD-10-CM

## 2024-10-10 RX ORDER — POTASSIUM CHLORIDE 20 MEQ/1
20 TABLET, EXTENDED RELEASE ORAL DAILY
Qty: 90 TABLET | Refills: 3 | Status: SHIPPED | OUTPATIENT
Start: 2024-10-10

## 2024-10-10 RX ORDER — TRIAMTERENE AND HYDROCHLOROTHIAZIDE 37.5; 25 MG/1; MG/1
1 CAPSULE ORAL DAILY
Qty: 90 CAPSULE | Refills: 3 | Status: SHIPPED | OUTPATIENT
Start: 2024-10-10

## 2024-11-14 DIAGNOSIS — R73.03 PREDIABETES: ICD-10-CM

## 2024-11-14 DIAGNOSIS — E78.5 HYPERLIPIDEMIA, UNSPECIFIED HYPERLIPIDEMIA TYPE: ICD-10-CM

## 2024-11-15 RX ORDER — METFORMIN HYDROCHLORIDE 500 MG/1
TABLET, EXTENDED RELEASE ORAL
Qty: 270 TABLET | Refills: 3 | Status: SHIPPED | OUTPATIENT
Start: 2024-11-15

## 2024-11-15 RX ORDER — LOVASTATIN 40 MG/1
40 TABLET ORAL DAILY
Qty: 90 TABLET | Refills: 3 | Status: SHIPPED | OUTPATIENT
Start: 2024-11-15

## 2024-11-19 ENCOUNTER — APPOINTMENT (OUTPATIENT)
Dept: PRIMARY CARE | Facility: CLINIC | Age: 84
End: 2024-11-19
Payer: MEDICARE

## 2024-11-19 DIAGNOSIS — E78.5 HYPERLIPIDEMIA, UNSPECIFIED HYPERLIPIDEMIA TYPE: ICD-10-CM

## 2024-11-19 DIAGNOSIS — E03.9 HYPOTHYROIDISM, UNSPECIFIED TYPE: ICD-10-CM

## 2024-11-19 DIAGNOSIS — Z00.00 MEDICARE ANNUAL WELLNESS VISIT, SUBSEQUENT: Primary | ICD-10-CM

## 2024-11-19 DIAGNOSIS — R41.3 MEMORY CHANGES: ICD-10-CM

## 2024-11-19 DIAGNOSIS — I10 HYPERTENSION, UNSPECIFIED TYPE: ICD-10-CM

## 2024-11-19 DIAGNOSIS — R73.03 PREDIABETES: ICD-10-CM

## 2024-11-19 DIAGNOSIS — F03.A0 MILD DEMENTIA, UNSPECIFIED DEMENTIA TYPE, UNSPECIFIED WHETHER BEHAVIORAL, PSYCHOTIC, OR MOOD DISTURBANCE OR ANXIETY: ICD-10-CM

## 2024-11-19 DIAGNOSIS — H90.3 BILATERAL SENSORINEURAL HEARING LOSS: ICD-10-CM

## 2024-11-19 DIAGNOSIS — I25.10 ATHEROSCLEROSIS OF NATIVE CORONARY ARTERY OF NATIVE HEART WITHOUT ANGINA PECTORIS: ICD-10-CM

## 2024-11-19 PROCEDURE — 1159F MED LIST DOCD IN RCRD: CPT | Performed by: PEDIATRICS

## 2024-11-19 PROCEDURE — 3075F SYST BP GE 130 - 139MM HG: CPT | Performed by: PEDIATRICS

## 2024-11-19 PROCEDURE — 3078F DIAST BP <80 MM HG: CPT | Performed by: PEDIATRICS

## 2024-11-19 PROCEDURE — 1158F ADVNC CARE PLAN TLK DOCD: CPT | Performed by: PEDIATRICS

## 2024-11-19 PROCEDURE — 99214 OFFICE O/P EST MOD 30 MIN: CPT | Performed by: PEDIATRICS

## 2024-11-19 PROCEDURE — 1123F ACP DISCUSS/DSCN MKR DOCD: CPT | Performed by: PEDIATRICS

## 2024-11-19 PROCEDURE — 1160F RVW MEDS BY RX/DR IN RCRD: CPT | Performed by: PEDIATRICS

## 2024-11-19 PROCEDURE — 1170F FXNL STATUS ASSESSED: CPT | Performed by: PEDIATRICS

## 2024-11-19 PROCEDURE — G0439 PPPS, SUBSEQ VISIT: HCPCS | Performed by: PEDIATRICS

## 2024-11-20 VITALS
RESPIRATION RATE: 17 BRPM | HEART RATE: 71 BPM | OXYGEN SATURATION: 96 % | SYSTOLIC BLOOD PRESSURE: 137 MMHG | BODY MASS INDEX: 26.52 KG/M2 | DIASTOLIC BLOOD PRESSURE: 72 MMHG | TEMPERATURE: 98.2 F | WEIGHT: 145 LBS

## 2024-11-20 PROBLEM — R94.4 DECREASED GFR: Status: RESOLVED | Noted: 2024-06-07 | Resolved: 2024-11-20

## 2024-11-20 PROBLEM — B37.9 MONILIA INFECTION: Status: RESOLVED | Noted: 2024-06-20 | Resolved: 2024-11-20

## 2024-11-20 PROBLEM — Z85.3 HISTORY OF MALIGNANT NEOPLASM OF BREAST: Status: ACTIVE | Noted: 2024-11-20

## 2024-11-20 PROBLEM — R77.8 ELEVATED TOTAL PROTEIN: Status: RESOLVED | Noted: 2024-06-01 | Resolved: 2024-11-20

## 2024-11-20 PROBLEM — F03.90 DEMENTIA: Status: ACTIVE | Noted: 2024-11-20

## 2024-11-20 RX ORDER — PROPRANOLOL HYDROCHLORIDE 20 MG/5ML
20 SOLUTION ORAL 3 TIMES DAILY
COMMUNITY
End: 2024-11-20 | Stop reason: WASHOUT

## 2024-11-20 RX ORDER — POTASSIUM CHLORIDE 20 MEQ/1
20 TABLET, EXTENDED RELEASE ORAL DAILY
Qty: 90 TABLET | Refills: 3 | Status: SHIPPED | OUTPATIENT
Start: 2024-11-20

## 2024-11-20 RX ORDER — LOVASTATIN 10 MG/1
10 TABLET ORAL NIGHTLY
COMMUNITY

## 2024-11-20 RX ORDER — PROPRANOLOL HYDROCHLORIDE 20 MG/1
20 TABLET ORAL 3 TIMES DAILY
COMMUNITY

## 2024-11-20 ASSESSMENT — ACTIVITIES OF DAILY LIVING (ADL)
GROCERY_SHOPPING: INDEPENDENT
TAKING_MEDICATION: TOTAL CARE
MANAGING_FINANCES: NEEDS ASSISTANCE
DRESSING: INDEPENDENT
DOING_HOUSEWORK: INDEPENDENT
BATHING: INDEPENDENT

## 2024-11-20 ASSESSMENT — PATIENT HEALTH QUESTIONNAIRE - PHQ9
2. FEELING DOWN, DEPRESSED OR HOPELESS: NOT AT ALL
SUM OF ALL RESPONSES TO PHQ9 QUESTIONS 1 AND 2: 0
1. LITTLE INTEREST OR PLEASURE IN DOING THINGS: NOT AT ALL

## 2024-11-20 NOTE — ASSESSMENT & PLAN NOTE
Orders:    Referral to Occupational Therapy; Future    Thyroid Stimulating Hormone; Future    Syphilis Screen with Reflex; Future    Vitamin B12; Future    CT head wo IV contrast; Future

## 2024-11-20 NOTE — PROGRESS NOTES
Subjective   Reason for Visit: Barbara Srinivasan is an 84 y.o. female here for a Medicare Wellness visit.          Reviewed all medications by prescribing practitioner or clinical pharmacist (such as prescriptions, OTCs, herbal therapies and supplements) and documented in the medical record.    HPI  Patient is accompanied by her POA's girlfriend who has known her for years. She is concerned about her forgetfulness and confusion at times. When driving to her cardiologist's office, she got lost and appeared confused. She forgot to bring her phone and her emergency contact list with her. Police called her doctor to find the emergency contact. She also repeatedly asks questions. When she takes a nap and wakes up, she thinks it is morning and has taken her meds all over again. So now POA has taken charge of filling medication box and holding her meds.   Dr Grayson, her cardiologist, decreased propranolol and lovastatin doses and stopped amlodipine, thinking these may affect her mind.  Patient Care Team:  Celina Baird MD as PCP - General (Pediatrics)  Celina Baird MD as PCP - [facility]     Review of Systems    Objective   Vitals:  /72   Pulse 71   Temp 36.8 °C (98.2 °F)   Resp 17   Wt 65.8 kg (145 lb)   SpO2 96%   BMI 26.52 kg/m²       Physical Exam  Bilateral ear wax  Lungs clear  Heart RRR  Abd soft  No edema  MMSE 22  Assessment & Plan  Hypertension, unspecified type    Orders:    potassium chloride CR 20 mEq ER tablet; Take 1 tablet (20 mEq) by mouth once daily.    Hyperlipidemia, unspecified hyperlipidemia type  Cholesterol 134 in May         Atherosclerosis of native coronary artery of native heart without angina pectoris  Recently saw Dr Grayson         Bilateral sensorineural hearing loss         Prediabetes  Last glucose 114    Orders:    Hemoglobin A1C; Future    Hypothyroidism, unspecified type         Mild dementia, unspecified dementia type, unspecified whether behavioral,  psychotic, or mood disturbance or anxiety    Orders:    Referral to Occupational Therapy; Future    Thyroid Stimulating Hormone; Future    Syphilis Screen with Reflex; Future    Vitamin B12; Future    CT head wo IV contrast; Future    Medicare annual wellness visit, subsequent         Memory changes    Orders:    Thyroid Stimulating Hormone; Future

## 2024-11-20 NOTE — ASSESSMENT & PLAN NOTE
Orders:    potassium chloride CR 20 mEq ER tablet; Take 1 tablet (20 mEq) by mouth once daily.

## 2024-12-23 ENCOUNTER — HOSPITAL ENCOUNTER (OUTPATIENT)
Dept: RADIOLOGY | Facility: HOSPITAL | Age: 84
Discharge: HOME | End: 2024-12-23
Payer: MEDICARE

## 2024-12-23 ENCOUNTER — LAB (OUTPATIENT)
Dept: LAB | Facility: LAB | Age: 84
End: 2024-12-23
Payer: MEDICARE

## 2024-12-23 DIAGNOSIS — R73.03 PREDIABETES: ICD-10-CM

## 2024-12-23 DIAGNOSIS — F03.A0 MILD DEMENTIA, UNSPECIFIED DEMENTIA TYPE, UNSPECIFIED WHETHER BEHAVIORAL, PSYCHOTIC, OR MOOD DISTURBANCE OR ANXIETY: ICD-10-CM

## 2024-12-23 DIAGNOSIS — E03.9 HYPOTHYROIDISM, UNSPECIFIED TYPE: ICD-10-CM

## 2024-12-23 DIAGNOSIS — R41.3 MEMORY CHANGES: ICD-10-CM

## 2024-12-23 LAB — TSH SERPL-ACNC: 3.04 MIU/L (ref 0.44–3.98)

## 2024-12-23 PROCEDURE — 84443 ASSAY THYROID STIM HORMONE: CPT

## 2024-12-23 PROCEDURE — 82607 VITAMIN B-12: CPT

## 2024-12-23 PROCEDURE — 83036 HEMOGLOBIN GLYCOSYLATED A1C: CPT

## 2024-12-23 PROCEDURE — 86780 TREPONEMA PALLIDUM: CPT

## 2024-12-23 PROCEDURE — 70450 CT HEAD/BRAIN W/O DYE: CPT

## 2024-12-23 PROCEDURE — 70450 CT HEAD/BRAIN W/O DYE: CPT | Performed by: RADIOLOGY

## 2024-12-24 ENCOUNTER — TELEPHONE (OUTPATIENT)
Dept: PRIMARY CARE | Facility: CLINIC | Age: 84
End: 2024-12-24
Payer: MEDICARE

## 2024-12-24 DIAGNOSIS — R73.03 PREDIABETES: ICD-10-CM

## 2024-12-24 DIAGNOSIS — E53.8 VITAMIN B12 DEFICIENCY: Primary | ICD-10-CM

## 2024-12-24 LAB
EST. AVERAGE GLUCOSE BLD GHB EST-MCNC: 103 MG/DL
HBA1C MFR BLD: 5.2 %
TREPONEMA PALLIDUM IGG+IGM AB [PRESENCE] IN SERUM OR PLASMA BY IMMUNOASSAY: NONREACTIVE
VIT B12 SERPL-MCNC: 201 PG/ML (ref 211–911)

## 2024-12-24 RX ORDER — LANOLIN ALCOHOL/MO/W.PET/CERES
1000 CREAM (GRAM) TOPICAL DAILY
Qty: 30 TABLET | Refills: 11 | Status: SHIPPED | OUTPATIENT
Start: 2024-12-24 | End: 2025-12-24

## 2024-12-24 NOTE — TELEPHONE ENCOUNTER
----- Message from Celina Baird sent at 12/24/2024 10:49 AM EST -----  Call Rasheed Srinivasan whose wife brought her in for memory decline; Low B12 can do this; start vitamin B12 1000 units daily; we can recheck in a month; if no better, then she needs b12 shots.

## 2024-12-24 NOTE — TELEPHONE ENCOUNTER
I notified Pt.nephlucina Iqbal and friend Coleen of Dr. Baird message. Rasheed and Coleen expressed understanding of the message given.

## 2024-12-24 NOTE — TELEPHONE ENCOUNTER
Unable to reach Rasheed Srinivasan by phone left voice mail message for Rasheed to call [phone] to retrieve Dr. Baird message.

## 2024-12-24 NOTE — TELEPHONE ENCOUNTER
I notified Pt. nephlucina Iqbal and friend Coleen  of Dr. Baird message. Rasheed and Coleen expressed understanding of the message given.

## 2024-12-27 RX ORDER — METFORMIN HYDROCHLORIDE 500 MG/1
TABLET, EXTENDED RELEASE ORAL
Qty: 270 TABLET | Refills: 0 | Status: SHIPPED | OUTPATIENT
Start: 2024-12-27

## 2025-01-07 DIAGNOSIS — E03.9 HYPOTHYROIDISM, UNSPECIFIED TYPE: ICD-10-CM

## 2025-01-08 RX ORDER — LEVOTHYROXINE SODIUM 100 UG/1
100 TABLET ORAL DAILY
Qty: 90 TABLET | Refills: 3 | Status: SHIPPED | OUTPATIENT
Start: 2025-01-08

## 2025-01-09 ENCOUNTER — APPOINTMENT (OUTPATIENT)
Dept: PRIMARY CARE | Facility: CLINIC | Age: 85
End: 2025-01-09
Payer: MEDICARE

## 2025-02-06 ENCOUNTER — TELEPHONE (OUTPATIENT)
Dept: PRIMARY CARE | Facility: CLINIC | Age: 85
End: 2025-02-06
Payer: MEDICARE

## 2025-02-06 DIAGNOSIS — E03.9 HYPOTHYROIDISM, UNSPECIFIED TYPE: ICD-10-CM

## 2025-02-06 NOTE — TELEPHONE ENCOUNTER
Dr. Oli Horne left a voicemail message stating she have a question regarding a medication that was prescribed for Barbara back in December call her back at [phone]. Thanks

## 2025-02-07 RX ORDER — LEVOTHYROXINE SODIUM 100 UG/1
100 TABLET ORAL DAILY
Qty: 90 TABLET | Refills: 2 | Status: SHIPPED | OUTPATIENT
Start: 2025-02-07

## 2025-02-19 ENCOUNTER — APPOINTMENT (OUTPATIENT)
Dept: PRIMARY CARE | Facility: CLINIC | Age: 85
End: 2025-02-19
Payer: MEDICARE

## 2025-02-21 ENCOUNTER — OFFICE VISIT (OUTPATIENT)
Dept: PRIMARY CARE | Facility: CLINIC | Age: 85
End: 2025-02-21
Payer: MEDICARE

## 2025-02-21 VITALS
SYSTOLIC BLOOD PRESSURE: 162 MMHG | DIASTOLIC BLOOD PRESSURE: 75 MMHG | HEIGHT: 60 IN | WEIGHT: 145 LBS | OXYGEN SATURATION: 98 % | BODY MASS INDEX: 28.47 KG/M2 | TEMPERATURE: 98.1 F | HEART RATE: 60 BPM

## 2025-02-21 DIAGNOSIS — H90.11 CONDUCTIVE HEARING LOSS OF RIGHT EAR, UNSPECIFIED HEARING STATUS ON CONTRALATERAL SIDE: ICD-10-CM

## 2025-02-21 DIAGNOSIS — H81.10 BENIGN PAROXYSMAL POSITIONAL VERTIGO, UNSPECIFIED LATERALITY: ICD-10-CM

## 2025-02-21 DIAGNOSIS — R73.03 PREDIABETES: ICD-10-CM

## 2025-02-21 DIAGNOSIS — E78.5 HYPERLIPIDEMIA, UNSPECIFIED HYPERLIPIDEMIA TYPE: Primary | ICD-10-CM

## 2025-02-21 DIAGNOSIS — Z85.3 HISTORY OF MALIGNANT NEOPLASM OF BREAST: ICD-10-CM

## 2025-02-21 DIAGNOSIS — N95.2 ATROPHIC VAGINITIS: ICD-10-CM

## 2025-02-21 DIAGNOSIS — F03.A0 MILD DEMENTIA, UNSPECIFIED DEMENTIA TYPE, UNSPECIFIED WHETHER BEHAVIORAL, PSYCHOTIC, OR MOOD DISTURBANCE OR ANXIETY: ICD-10-CM

## 2025-02-21 DIAGNOSIS — H90.3 BILATERAL SENSORINEURAL HEARING LOSS: ICD-10-CM

## 2025-02-21 DIAGNOSIS — I25.10 ATHEROSCLEROSIS OF NATIVE CORONARY ARTERY OF NATIVE HEART WITHOUT ANGINA PECTORIS: ICD-10-CM

## 2025-02-21 DIAGNOSIS — E03.9 HYPOTHYROIDISM, UNSPECIFIED TYPE: ICD-10-CM

## 2025-02-21 DIAGNOSIS — E53.8 VITAMIN B12 DEFICIENCY: ICD-10-CM

## 2025-02-21 PROBLEM — B35.1 ONYCHOMYCOSIS OF TOENAIL: Status: RESOLVED | Noted: 2023-05-01 | Resolved: 2025-02-21

## 2025-02-21 PROCEDURE — 3077F SYST BP >= 140 MM HG: CPT | Performed by: PEDIATRICS

## 2025-02-21 PROCEDURE — G2211 COMPLEX E/M VISIT ADD ON: HCPCS | Performed by: PEDIATRICS

## 2025-02-21 PROCEDURE — 1036F TOBACCO NON-USER: CPT | Performed by: PEDIATRICS

## 2025-02-21 PROCEDURE — 99214 OFFICE O/P EST MOD 30 MIN: CPT | Performed by: PEDIATRICS

## 2025-02-21 PROCEDURE — 3078F DIAST BP <80 MM HG: CPT | Performed by: PEDIATRICS

## 2025-02-21 RX ORDER — LANOLIN ALCOHOL/MO/W.PET/CERES
1000 CREAM (GRAM) TOPICAL DAILY
Qty: 90 TABLET | Refills: 3 | Status: SHIPPED | OUTPATIENT
Start: 2025-02-21 | End: 2026-02-21

## 2025-02-21 RX ORDER — CONJUGATED ESTROGENS 0.62 MG/G
CREAM VAGINAL
Qty: 30 G | Refills: 11 | Status: SHIPPED | OUTPATIENT
Start: 2025-02-21

## 2025-02-21 ASSESSMENT — ENCOUNTER SYMPTOMS
CHILLS: 0
DIFFICULTY URINATING: 0
FEVER: 0
SHORTNESS OF BREATH: 0
DIARRHEA: 1
ABDOMINAL PAIN: 0
PALPITATIONS: 0
ABDOMINAL DISTENTION: 0
BLOOD IN STOOL: 0
ACTIVITY CHANGE: 0
NAUSEA: 0
APPETITE CHANGE: 0
CONSTIPATION: 0
EYES NEGATIVE: 1
FATIGUE: 0
FLANK PAIN: 0
HEADACHES: 0
COUGH: 0
DYSURIA: 0
LIGHT-HEADEDNESS: 0
BACK PAIN: 0
ARTHRALGIAS: 0
DIZZINESS: 0
VOMITING: 0
FREQUENCY: 0
MYALGIAS: 0

## 2025-02-21 NOTE — ASSESSMENT & PLAN NOTE
Patient does not want to see ENT/Audiologists, had impacted cerumen removed by caretaker, patient notes hearing improved.

## 2025-02-21 NOTE — ASSESSMENT & PLAN NOTE
Patient does not take BP at home. According to caretaker BP has been running low at her cardiology apts, BP in office 162/75  Currently taking   triamterene-hydrochlorothiazid (Dyazide) 37.5-25 mg capsule     propranolol (Inderal) 20 mg tablet   potassium chloride CR 20 mEq ER tablet

## 2025-02-21 NOTE — PROGRESS NOTES
Subjective   Patient ID: Barbara Srinivasan is a 84 y.o. female who presents for Hypertension.    HPI   Patients caretaker reports the patient has been having an increase in loose stools. Occurs 4-5x month. Patient has been introducing more vegetable in her diet. Patient has had to cancel plans when the diarrhea occurs. Denies recent flu or illness, no fecal or bladder incontinence, fever, abdominal pain, N/V.     Patient needs a refill for her Levothyroxine at [facility].     Patient no longer requires mammograms.    DEXA Scan due in 2026  \  Review of Systems   Constitutional:  Negative for activity change, appetite change, chills, fatigue and fever.   HENT: Negative.     Eyes: Negative.    Respiratory:  Negative for cough and shortness of breath.    Cardiovascular:  Negative for chest pain and palpitations.   Gastrointestinal:  Positive for diarrhea. Negative for abdominal distention, abdominal pain, blood in stool, constipation, nausea and vomiting.   Genitourinary:  Negative for difficulty urinating, dysuria, flank pain, frequency and urgency.   Musculoskeletal:  Negative for arthralgias, back pain and myalgias.   Skin: Negative.    Neurological:  Negative for dizziness, light-headedness and headaches.       Objective   /75 (BP Location: Left arm, Patient Position: Sitting)   Pulse 60   Temp 36.7 °C (98.1 °F)   Ht 1.524 m (5')   Wt 65.8 kg (145 lb)   SpO2 98%   BMI 28.32 kg/m²     Physical Exam  Vitals reviewed.   Constitutional:       Appearance: Normal appearance.   HENT:      Head: Normocephalic and atraumatic.      Mouth/Throat:      Mouth: Mucous membranes are moist.      Pharynx: Oropharynx is clear. No oropharyngeal exudate or posterior oropharyngeal erythema.   Eyes:      Extraocular Movements: Extraocular movements intact.      Conjunctiva/sclera: Conjunctivae normal.      Pupils: Pupils are equal, round, and reactive to light.   Cardiovascular:      Rate and Rhythm: Normal rate and regular  rhythm.      Pulses: Normal pulses.      Heart sounds: Normal heart sounds.   Pulmonary:      Effort: Pulmonary effort is normal.      Breath sounds: Normal breath sounds.   Musculoskeletal:      Cervical back: Normal range of motion and neck supple.   Skin:     General: Skin is warm.      Capillary Refill: Capillary refill takes less than 2 seconds.   Neurological:      Mental Status: She is alert.         Assessment/Plan   Problem List Items Addressed This Visit             ICD-10-CM    Atherosclerotic heart disease of native coronary artery without angina pectoris I25.10     Follows Dr Grayson               Benign positional vertigo H81.10     Walking with cane, dizziness occurs when she gets up too fast. No episodes of prolonged dizziness         Bilateral sensorineural hearing loss H90.3     Patient does not want to see ENT/Audiologists, had impacted cerumen removed by caretaker, patient notes hearing improved.          Hearing loss in right ear H91.91     Patient does not want to see ENT/Audiologists, had impacted cerumen removed by caretaker, patient notes hearing improved.         Hyperlipidemia - Primary E78.5     Cholesterol 134 lipid panel due in May 2025         Hypothyroidism E03.9     Taking Levothyroxine         Prediabetes R73.03     Hb A1C 5.2 in Dec. 2024         History of malignant neoplasm of breast Z85.3     Right Breast, Surgical removal of lumpectomy + lymph tissue         Dementia F03.90     Referral to Occupational Therapy; Future, patient still needs recommendations since information did not get to caretaker.      CT head wo IV contrast - No acute intracranial findings          Vitamin B12 deficiency E53.8     Issue receiving B12 Meds through Home delivery pharmacy. Needs B12 sent to Pharmacy

## 2025-02-21 NOTE — PATIENT INSTRUCTIONS
Avoid chips and too much salt in the cooking.  Check some home blood pressures.  Follow up one month.  Send me home blood pressures;

## 2025-02-21 NOTE — ASSESSMENT & PLAN NOTE
Referral to Occupational Therapy; Future, patient still needs recommendations since information did not get to caretaker.      CT head wo IV contrast - No acute intracranial findings

## 2025-03-02 DIAGNOSIS — N95.2 ATROPHIC VAGINITIS: ICD-10-CM

## 2025-03-02 RX ORDER — CONJUGATED ESTROGENS 0.62 MG/G
CREAM VAGINAL
Qty: 30 G | Refills: 11 | Status: SHIPPED | OUTPATIENT
Start: 2025-03-02

## 2025-03-11 ENCOUNTER — TELEPHONE (OUTPATIENT)
Dept: PRIMARY CARE | Facility: CLINIC | Age: 85
End: 2025-03-11
Payer: MEDICARE

## 2025-03-11 NOTE — TELEPHONE ENCOUNTER
Patient's caregiver Coleen can be reached at [phone]. She is requesting a call to discuss the patient's hospitalization and rehab status.

## 2025-03-26 ENCOUNTER — APPOINTMENT (OUTPATIENT)
Dept: PRIMARY CARE | Facility: CLINIC | Age: 85
End: 2025-03-26
Payer: MEDICARE

## 2025-03-31 ENCOUNTER — HOME HEALTH ADMISSION (OUTPATIENT)
Dept: HOME HEALTH SERVICES | Facility: HOME HEALTH | Age: 85
End: 2025-03-31
Payer: MEDICARE

## 2025-04-01 ENCOUNTER — DOCUMENTATION (OUTPATIENT)
Dept: HOME HEALTH SERVICES | Facility: HOME HEALTH | Age: 85
End: 2025-04-01
Payer: MEDICARE

## 2025-04-03 ENCOUNTER — HOME CARE VISIT (OUTPATIENT)
Dept: HOME HEALTH SERVICES | Facility: HOME HEALTH | Age: 85
End: 2025-04-03
Payer: MEDICARE

## 2025-04-03 VITALS
TEMPERATURE: 98.9 F | DIASTOLIC BLOOD PRESSURE: 68 MMHG | SYSTOLIC BLOOD PRESSURE: 132 MMHG | RESPIRATION RATE: 20 BRPM | OXYGEN SATURATION: 97 % | HEART RATE: 64 BPM

## 2025-04-03 PROCEDURE — 169592 NO-PAY CLAIM PROCEDURE

## 2025-04-03 PROCEDURE — G0299 HHS/HOSPICE OF RN EA 15 MIN: HCPCS | Mod: HHH

## 2025-04-03 ASSESSMENT — PAIN SCALES - PAIN ASSESSMENT IN ADVANCED DEMENTIA (PAINAD)
NEGVOCALIZATION: 1
CONSOLABILITY: 0 - NO NEED TO CONSOLE.
BREATHING: 0
BODYLANGUAGE: 0
NEGVOCALIZATION: 1 - OCCASIONAL MOAN OR GROAN. LOW-LEVEL SPEECH WITH A NEGATIVE OR DISAPPROVING QUALITY.
CONSOLABILITY: 0
FACIALEXPRESSION: 0 - SMILING OR INEXPRESSIVE.
FACIALEXPRESSION: 0
TOTALSCORE: 1
BODYLANGUAGE: 0 - RELAXED.

## 2025-04-03 ASSESSMENT — ENCOUNTER SYMPTOMS
STOOL FREQUENCY: DAILY
PAIN LOCATION: LEFT SHOULDER
FORGETFULNESS: 1
DESCRIPTION OF MEMORY LOSS: SHORT TERM
DESCRIPTION OF MEMORY LOSS: LONG TERM
AGITATION: 1
FATIGUE: 1
PERSON REPORTING PAIN: CAREGIVER
LOSS OF SENSATION IN FEET: 1
BOWEL PATTERN NORMAL: 1
MUSCLE WEAKNESS: 1
OCCASIONAL FEELINGS OF UNSTEADINESS: 1
APPETITE LEVEL: GOOD
LOWER EXTREMITY EDEMA: 1
DRY SKIN: 1
PAIN: 1
SHORTNESS OF BREATH: 1
LAST BOWEL MOVEMENT: 67298
DEPRESSION: 0
CHANGE IN APPETITE: UNCHANGED
DYSPNEA ACTIVITY LEVEL: AFTER AMBULATING 10 - 20 FT

## 2025-04-03 ASSESSMENT — ACTIVITIES OF DAILY LIVING (ADL)
OASIS_M1830: 06
ENTERING_EXITING_HOME: MODERATE ASSIST
AMBULATION ASSISTANCE: ONE PERSON
AMBULATION ASSISTANCE: 1

## 2025-04-04 ENCOUNTER — HOME CARE VISIT (OUTPATIENT)
Dept: HOME HEALTH SERVICES | Facility: HOME HEALTH | Age: 85
End: 2025-04-04
Payer: MEDICARE

## 2025-04-04 ENCOUNTER — TELEPHONE (OUTPATIENT)
Dept: PRIMARY CARE | Facility: CLINIC | Age: 85
End: 2025-04-04
Payer: MEDICARE

## 2025-04-04 VITALS — OXYGEN SATURATION: 95 % | HEART RATE: 62 BPM | RESPIRATION RATE: 18 BRPM | TEMPERATURE: 98.4 F

## 2025-04-04 PROCEDURE — G0152 HHCP-SERV OF OT,EA 15 MIN: HCPCS | Mod: HHH

## 2025-04-04 ASSESSMENT — ACTIVITIES OF DAILY LIVING (ADL)
BATHING_CURRENT_FUNCTION: MODERATE ASSIST
TOILETING: MAXIMUM ASSIST
BATHING ASSESSED: 1
FEEDING_WITHIN_DEFINED_LIMITS: 1
DRESSING_LB_CURRENT_FUNCTION: MODERATE ASSIST
TOILETING: MODERATE ASSIST
BATHING_CURRENT_FUNCTION: MAXIMUM ASSIST
TOILETING: 1
DRESSING_LB_CURRENT_FUNCTION: MAXIMUM ASSIST

## 2025-04-04 ASSESSMENT — PAIN SCALES - PAIN ASSESSMENT IN ADVANCED DEMENTIA (PAINAD)
BREATHING: 0
BODYLANGUAGE: 0
CONSOLABILITY: 0 - NO NEED TO CONSOLE.
BODYLANGUAGE: 0 - RELAXED.
FACIALEXPRESSION: 0
FACIALEXPRESSION: 0 - SMILING OR INEXPRESSIVE.
NEGVOCALIZATION: 0
CONSOLABILITY: 0
TOTALSCORE: 0
NEGVOCALIZATION: 0 - NONE.

## 2025-04-04 ASSESSMENT — ENCOUNTER SYMPTOMS
PAIN SEVERITY GOAL: 0/10
SUBJECTIVE PAIN PROGRESSION: GRADUALLY IMPROVING
PAIN: 1
HIGHEST PAIN SEVERITY IN PAST 24 HOURS: 6/10
LOWEST PAIN SEVERITY IN PAST 24 HOURS: 2/10
PERSON REPORTING PAIN: PATIENT

## 2025-04-04 NOTE — TELEPHONE ENCOUNTER
Coleen was released from rehab on 4/1 from a fall. They would like an afternoon appt within the next two weeks because she more alert. Please advise

## 2025-04-07 ENCOUNTER — HOME CARE VISIT (OUTPATIENT)
Dept: HOME HEALTH SERVICES | Facility: HOME HEALTH | Age: 85
End: 2025-04-07
Payer: MEDICARE

## 2025-04-07 VITALS
OXYGEN SATURATION: 96 % | SYSTOLIC BLOOD PRESSURE: 130 MMHG | HEART RATE: 64 BPM | DIASTOLIC BLOOD PRESSURE: 62 MMHG | RESPIRATION RATE: 16 BRPM | TEMPERATURE: 99.2 F

## 2025-04-07 PROCEDURE — G0300 HHS/HOSPICE OF LPN EA 15 MIN: HCPCS | Mod: HHH

## 2025-04-07 ASSESSMENT — ENCOUNTER SYMPTOMS
APPETITE LEVEL: GOOD
CHANGE IN APPETITE: UNCHANGED
DESCRIPTION OF MEMORY LOSS: SHORT TERM
DENIES PAIN: 1
LAST BOWEL MOVEMENT: 67301

## 2025-04-08 ENCOUNTER — HOME CARE VISIT (OUTPATIENT)
Dept: HOME HEALTH SERVICES | Facility: HOME HEALTH | Age: 85
End: 2025-04-08
Payer: MEDICARE

## 2025-04-08 VITALS — SYSTOLIC BLOOD PRESSURE: 120 MMHG | DIASTOLIC BLOOD PRESSURE: 70 MMHG | HEART RATE: 70 BPM | TEMPERATURE: 98 F

## 2025-04-08 PROCEDURE — G0156 HHCP-SVS OF AIDE,EA 15 MIN: HCPCS | Mod: HHH

## 2025-04-08 PROCEDURE — G0151 HHCP-SERV OF PT,EA 15 MIN: HCPCS | Mod: HHH

## 2025-04-08 SDOH — HEALTH STABILITY: PHYSICAL HEALTH: EXERCISE COMMENTS: SITTING LAQ MARCHING AP HIP ABD X 10 REPS TOL WELL AND WITHOUT CO PAIN

## 2025-04-08 ASSESSMENT — ENCOUNTER SYMPTOMS
OCCASIONAL FEELINGS OF UNSTEADINESS: 1
PAIN LOCATION: LEFT SHOULDER
LOWEST PAIN SEVERITY IN PAST 24 HOURS: 0/10
PERSON REPORTING PAIN: PATIENT
DEPRESSION: 0
HIGHEST PAIN SEVERITY IN PAST 24 HOURS: 8/10
ARTHRALGIAS: 1
PAIN: 1
PAIN LOCATION - EXACERBATING FACTORS: MOVEMENT
LOSS OF SENSATION IN FEET: 0

## 2025-04-08 ASSESSMENT — ACTIVITIES OF DAILY LIVING (ADL)
CURRENT_FUNCTION: ONE PERSON
AMBULATION ASSISTANCE: STAND BY ASSIST

## 2025-04-09 ENCOUNTER — HOME CARE VISIT (OUTPATIENT)
Dept: HOME HEALTH SERVICES | Facility: HOME HEALTH | Age: 85
End: 2025-04-09
Payer: MEDICARE

## 2025-04-09 ENCOUNTER — OFFICE VISIT (OUTPATIENT)
Dept: PRIMARY CARE | Facility: CLINIC | Age: 85
End: 2025-04-09
Payer: MEDICARE

## 2025-04-09 VITALS
HEART RATE: 68 BPM | BODY MASS INDEX: 28.32 KG/M2 | HEIGHT: 60 IN | DIASTOLIC BLOOD PRESSURE: 82 MMHG | TEMPERATURE: 97.9 F | OXYGEN SATURATION: 96 % | SYSTOLIC BLOOD PRESSURE: 123 MMHG

## 2025-04-09 DIAGNOSIS — E78.5 HYPERLIPIDEMIA, UNSPECIFIED HYPERLIPIDEMIA TYPE: ICD-10-CM

## 2025-04-09 DIAGNOSIS — I10 HYPERTENSION, UNSPECIFIED TYPE: ICD-10-CM

## 2025-04-09 DIAGNOSIS — S46.002D INJURY OF LEFT ROTATOR CUFF, SUBSEQUENT ENCOUNTER: ICD-10-CM

## 2025-04-09 DIAGNOSIS — F03.A0 MILD DEMENTIA, UNSPECIFIED DEMENTIA TYPE, UNSPECIFIED WHETHER BEHAVIORAL, PSYCHOTIC, OR MOOD DISTURBANCE OR ANXIETY: ICD-10-CM

## 2025-04-09 DIAGNOSIS — E03.9 HYPOTHYROIDISM, UNSPECIFIED TYPE: ICD-10-CM

## 2025-04-09 DIAGNOSIS — E53.8 VITAMIN B12 DEFICIENCY: ICD-10-CM

## 2025-04-09 DIAGNOSIS — R73.03 PREDIABETES: ICD-10-CM

## 2025-04-09 PROBLEM — S46.002A INJURY OF LEFT ROTATOR CUFF: Status: ACTIVE | Noted: 2025-04-09

## 2025-04-09 LAB
ANION GAP SERPL CALC-SCNC: 14 MMOL/L (ref 10–20)
BASOPHILS # BLD AUTO: 0.02 X10*3/UL (ref 0.1–1.6)
BASOPHILS NFR BLD AUTO: 0.34 % (ref 0–0.3)
BUN SERPL-MCNC: 29 MG/DL (ref 7–18)
CALCIUM SERPL-MCNC: 10.4 MG/DL (ref 8.5–10.1)
CHLORIDE SERPL-SCNC: 97 MMOL/L (ref 98–107)
CO2 SERPL-SCNC: 30 MMOL/L (ref 21–32)
CREAT SERPL-MCNC: 0.91 MG/DL (ref 0.6–1.1)
EGFRCR SERPLBLD CKD-EPI 2021: 62 ML/MIN/1.73M*2
EOSINOPHIL # BLD AUTO: 0.07 X10*3/UL (ref 0.04–0.5)
EOSINOPHIL NFR BLD AUTO: 1.08 % (ref 0.7–7)
ERYTHROCYTE [DISTWIDTH] IN BLOOD BY AUTOMATED COUNT: 14.7 % (ref 11.5–14.5)
GLUCOSE SERPL-MCNC: 116 MG/DL (ref 74–100)
HCT VFR BLD AUTO: 34 % (ref 36.6–46.6)
HGB BLD-MCNC: 11.76 G/DL (ref 12–15.4)
LYMPHOCYTES # BLD AUTO: 1.24 X10*3/UL (ref 0–6)
LYMPHOCYTES NFR BLD AUTO: 19.87 % (ref 20.5–51.1)
MCH RBC QN AUTO: 31.9 PG (ref 26–32)
MCHC RBC AUTO-ENTMCNC: 34.6 G/DL (ref 31–38)
MCV RBC AUTO: 92.2 FL (ref 80–96)
MONOCYTES # BLD AUTO: 0.47 X10*3/UL (ref 1.6–24.9)
MONOCYTES NFR BLD AUTO: 7.48 % (ref 1.7–9.3)
NEUTROPHILS # BLD AUTO: 4.43 X10*3/UL (ref 1.4–6.5)
NEUTROPHILS NFR BLD AUTO: 71.23 % (ref 42.2–75.2)
PLATELET # BLD AUTO: 279.8 X10*3/UL (ref 150–450)
PMV BLD AUTO: 7.68 FL (ref 7.8–11)
POTASSIUM SERPL-SCNC: 4.2 MMOL/L (ref 3.5–5.1)
RBC # BLD AUTO: 3.69 X10*6/UL (ref 3.9–5.3)
SODIUM SERPL-SCNC: 137 MMOL/L (ref 136–145)
VIT B12 SERPL-MCNC: 724 PG/ML (ref 193–986)
WBC # BLD AUTO: 6.22 X10*3/UL (ref 4.5–10.5)

## 2025-04-09 PROCEDURE — 82607 VITAMIN B-12: CPT | Performed by: PEDIATRICS

## 2025-04-09 PROCEDURE — 85025 COMPLETE CBC W/AUTO DIFF WBC: CPT | Performed by: PEDIATRICS

## 2025-04-09 PROCEDURE — 3074F SYST BP LT 130 MM HG: CPT | Performed by: PEDIATRICS

## 2025-04-09 PROCEDURE — 99214 OFFICE O/P EST MOD 30 MIN: CPT | Performed by: PEDIATRICS

## 2025-04-09 PROCEDURE — 1159F MED LIST DOCD IN RCRD: CPT | Performed by: PEDIATRICS

## 2025-04-09 PROCEDURE — G0152 HHCP-SERV OF OT,EA 15 MIN: HCPCS | Mod: HHH

## 2025-04-09 PROCEDURE — 80048 BASIC METABOLIC PNL TOTAL CA: CPT | Performed by: PEDIATRICS

## 2025-04-09 PROCEDURE — G2211 COMPLEX E/M VISIT ADD ON: HCPCS | Performed by: PEDIATRICS

## 2025-04-09 PROCEDURE — 3079F DIAST BP 80-89 MM HG: CPT | Performed by: PEDIATRICS

## 2025-04-09 RX ORDER — METFORMIN HYDROCHLORIDE 500 MG/1
TABLET, EXTENDED RELEASE ORAL
Qty: 270 TABLET | Refills: 1 | Status: SHIPPED | OUTPATIENT
Start: 2025-04-09

## 2025-04-09 RX ORDER — DONEPEZIL HYDROCHLORIDE 5 MG/1
TABLET, FILM COATED ORAL
Qty: 30 TABLET | Refills: 5 | Status: SHIPPED | OUTPATIENT
Start: 2025-04-09 | End: 2025-04-10 | Stop reason: SDUPTHER

## 2025-04-09 ASSESSMENT — PAIN SCALES - PAIN ASSESSMENT IN ADVANCED DEMENTIA (PAINAD)
BODYLANGUAGE: 0 - RELAXED.
CONSOLABILITY: 0 - NO NEED TO CONSOLE.
CONSOLABILITY: 0
BREATHING: 0
NEGVOCALIZATION: 0
TOTALSCORE: 0
FACIALEXPRESSION: 0 - SMILING OR INEXPRESSIVE.
FACIALEXPRESSION: 0
NEGVOCALIZATION: 0 - NONE.
BODYLANGUAGE: 0

## 2025-04-09 ASSESSMENT — ACTIVITIES OF DAILY LIVING (ADL)
DRESSING_LB_CURRENT_FUNCTION: MINIMUM ASSIST
TOILETING: MINIMUM ASSIST
BATHING_CURRENT_FUNCTION: MINIMUM ASSIST
TOILETING: 1
BATHING ASSESSED: 1

## 2025-04-09 ASSESSMENT — ENCOUNTER SYMPTOMS
HIGHEST PAIN SEVERITY IN PAST 24 HOURS: 8/10
SUBJECTIVE PAIN PROGRESSION: WAXING AND WANING
LOWEST PAIN SEVERITY IN PAST 24 HOURS: 4/10
PAIN SEVERITY GOAL: 0/10
PERSON REPORTING PAIN: PATIENT
PAIN: 1

## 2025-04-09 NOTE — LETTER
Barbara Srinivasan ([birth year]) has dementia and has a therapy dog that calms her down when agitated. Please allow her to keep the dog.

## 2025-04-09 NOTE — PROGRESS NOTES
Subjective   Patient ID: Barbara Srinivasan is a 84 y.o. female who presents for Hyperlipidemia and Hypertension.    HPI   Here with her caretaker who she now lives with.  Patient found fallen February 25th, was estimated to be down for 2.5 days. Patient has dementia and does not remember how she fell. Had two CT scans which revealed no fractures or acute findings. Was in rehab for four weeks, finished last Tuesday. Uses cane and walker but has to have caretaker nearby due to still be unsteady. She is bothered by left shoulder pain.   Still has pressure wound on right foot which caregiver states is 50% better from 2 weeks ago with current treatment from wound nurse.    Review of Systems    Objective   /82 (BP Location: Left arm)   Pulse 68   Temp 36.6 °C (97.9 °F) (Temporal)   Ht 1.524 m (5')   SpO2 96%   BMI 28.32 kg/m²     Physical Exam  In wheelchair, can transfer with assistance and walker  Scabbed quarter sized wound on right foot and left knee.   Heart RRR  Lungs CTA  Unable to abduct left arm  Assessment/Plan   Problem List Items Addressed This Visit             ICD-10-CM    Hyperlipidemia E78.5     On Lovastatin. Lipid panel due May 2025         Hypertension I10     On Triamterene-hydrochlorothiazide          Hypothyroidism E03.9     Last TSH 4.21 one month ago. Taking Levothyroxine 100mcg         Prediabetes R73.03     Morning blood sugar , checks weekly. On Metformin. Last A1C 5.2 three months ago.          Relevant Medications    metFORMIN  mg 24 hr tablet    Other Relevant Orders    Basic Metabolic Panel    Dementia F03.90     When she has sundowner's episode, the dog calms her down         Relevant Medications    donepezil (Aricept) 5 mg tablet    Other Relevant Orders    Referral to Neurology    Vitamin B12 deficiency E53.8     Taking supplements.         Relevant Orders    CBC w/5 Part Differential, [facility]    Vitamin B12    Injury of left rotator cuff S46.002A    Relevant  Orders    MR shoulder left wo IV contrast

## 2025-04-10 ENCOUNTER — HOME CARE VISIT (OUTPATIENT)
Dept: HOME HEALTH SERVICES | Facility: HOME HEALTH | Age: 85
End: 2025-04-10
Payer: MEDICARE

## 2025-04-10 VITALS
OXYGEN SATURATION: 98 % | HEART RATE: 64 BPM | SYSTOLIC BLOOD PRESSURE: 110 MMHG | TEMPERATURE: 98.4 F | DIASTOLIC BLOOD PRESSURE: 58 MMHG | RESPIRATION RATE: 16 BRPM

## 2025-04-10 VITALS — DIASTOLIC BLOOD PRESSURE: 70 MMHG | SYSTOLIC BLOOD PRESSURE: 118 MMHG | TEMPERATURE: 97.4 F

## 2025-04-10 DIAGNOSIS — F03.A0 MILD DEMENTIA, UNSPECIFIED DEMENTIA TYPE, UNSPECIFIED WHETHER BEHAVIORAL, PSYCHOTIC, OR MOOD DISTURBANCE OR ANXIETY: ICD-10-CM

## 2025-04-10 PROCEDURE — G0151 HHCP-SERV OF PT,EA 15 MIN: HCPCS | Mod: HHH

## 2025-04-10 PROCEDURE — G0300 HHS/HOSPICE OF LPN EA 15 MIN: HCPCS | Mod: HHH

## 2025-04-10 ASSESSMENT — ENCOUNTER SYMPTOMS
PAIN: 1
LAST BOWEL MOVEMENT: 67305
DENIES PAIN: 1
APPETITE LEVEL: GOOD
DESCRIPTION OF MEMORY LOSS: LONG TERM
DESCRIPTION OF MEMORY LOSS: SHORT TERM
CHANGE IN APPETITE: UNCHANGED
PERSON REPORTING PAIN: PATIENT
PAIN LOCATION: LEFT SHOULDER

## 2025-04-10 ASSESSMENT — ACTIVITIES OF DAILY LIVING (ADL): AMBULATION ASSISTANCE: ONE PERSON

## 2025-04-11 ENCOUNTER — HOME CARE VISIT (OUTPATIENT)
Dept: HOME HEALTH SERVICES | Facility: HOME HEALTH | Age: 85
End: 2025-04-11
Payer: MEDICARE

## 2025-04-11 VITALS — OXYGEN SATURATION: 95 % | HEART RATE: 66 BPM | TEMPERATURE: 98.1 F | RESPIRATION RATE: 18 BRPM

## 2025-04-11 PROCEDURE — G0152 HHCP-SERV OF OT,EA 15 MIN: HCPCS | Mod: HHH

## 2025-04-11 RX ORDER — DONEPEZIL HYDROCHLORIDE 5 MG/1
TABLET, FILM COATED ORAL
Qty: 90 TABLET | Refills: 1 | Status: SHIPPED | OUTPATIENT
Start: 2025-04-11

## 2025-04-11 ASSESSMENT — ACTIVITIES OF DAILY LIVING (ADL)
DRESSING_LB_CURRENT_FUNCTION: MINIMUM ASSIST
TOILETING: CONTACT GUARD ASSIST
DRESSING_LB_CURRENT_FUNCTION: CONTACT GUARD ASSIST
BATHING_CURRENT_FUNCTION: MINIMUM ASSIST
BATHING_CURRENT_FUNCTION: CONTACT GUARD ASSIST
TOILETING: MINIMUM ASSIST
TOILETING: 1
BATHING ASSESSED: 1

## 2025-04-11 ASSESSMENT — PAIN SCALES - PAIN ASSESSMENT IN ADVANCED DEMENTIA (PAINAD)
BREATHING: 0
BODYLANGUAGE: 0 - RELAXED.
BODYLANGUAGE: 0
CONSOLABILITY: 0
TOTALSCORE: 0
NEGVOCALIZATION: 0
FACIALEXPRESSION: 0
CONSOLABILITY: 0 - NO NEED TO CONSOLE.
NEGVOCALIZATION: 0 - NONE.
FACIALEXPRESSION: 0 - SMILING OR INEXPRESSIVE.

## 2025-04-11 ASSESSMENT — ENCOUNTER SYMPTOMS
LOWEST PAIN SEVERITY IN PAST 24 HOURS: 4/10
HIGHEST PAIN SEVERITY IN PAST 24 HOURS: 8/10
SUBJECTIVE PAIN PROGRESSION: WAXING AND WANING
PAIN SEVERITY GOAL: 0/10
PAIN: 1
PERSON REPORTING PAIN: PATIENT

## 2025-04-14 ENCOUNTER — TELEPHONE (OUTPATIENT)
Dept: PRIMARY CARE | Facility: CLINIC | Age: 85
End: 2025-04-14
Payer: MEDICARE

## 2025-04-14 NOTE — TELEPHONE ENCOUNTER
Spoke with caregiver. Relayed doctor's notes about recent labwork and recommendations, caregiver expressed understanding. States she is also in the process of scheduling patient's MRI.

## 2025-04-14 NOTE — TELEPHONE ENCOUNTER
----- Message from Celina Baird sent at 4/11/2025  6:53 AM EDT -----  Tell caregiver that her blood count improved from when she was at the hospital. Vitamin B12 has normalized on the oral B12. Continue this. Sugar is still prediabetic; continue diabetic diet

## 2025-04-15 ENCOUNTER — HOME CARE VISIT (OUTPATIENT)
Dept: HOME HEALTH SERVICES | Facility: HOME HEALTH | Age: 85
End: 2025-04-15
Payer: MEDICARE

## 2025-04-15 VITALS
HEART RATE: 65 BPM | TEMPERATURE: 97.9 F | OXYGEN SATURATION: 96 % | SYSTOLIC BLOOD PRESSURE: 128 MMHG | DIASTOLIC BLOOD PRESSURE: 76 MMHG | RESPIRATION RATE: 16 BRPM

## 2025-04-15 VITALS — DIASTOLIC BLOOD PRESSURE: 72 MMHG | TEMPERATURE: 97.8 F | SYSTOLIC BLOOD PRESSURE: 138 MMHG

## 2025-04-15 PROCEDURE — G0300 HHS/HOSPICE OF LPN EA 15 MIN: HCPCS | Mod: HHH

## 2025-04-15 PROCEDURE — G0151 HHCP-SERV OF PT,EA 15 MIN: HCPCS | Mod: HHH

## 2025-04-15 ASSESSMENT — ENCOUNTER SYMPTOMS
PAIN: 1
PAIN LOCATION - EXACERBATING FACTORS: MOVEMENT
LOWEST PAIN SEVERITY IN PAST 24 HOURS: 0/10
DENIES PAIN: 1
LAST BOWEL MOVEMENT: 67309
DESCRIPTION OF MEMORY LOSS: SHORT TERM
CHANGE IN APPETITE: UNCHANGED
APPETITE LEVEL: GOOD
PAIN LOCATION: LEFT SHOULDER
PAIN LOCATION - PAIN SEVERITY: 0/10

## 2025-04-16 ENCOUNTER — HOME CARE VISIT (OUTPATIENT)
Dept: HOME HEALTH SERVICES | Facility: HOME HEALTH | Age: 85
End: 2025-04-16
Payer: MEDICARE

## 2025-04-16 VITALS — TEMPERATURE: 97.7 F | RESPIRATION RATE: 18 BRPM | HEART RATE: 69 BPM | OXYGEN SATURATION: 96 %

## 2025-04-16 PROCEDURE — G0152 HHCP-SERV OF OT,EA 15 MIN: HCPCS | Mod: HHH

## 2025-04-16 PROCEDURE — G0156 HHCP-SVS OF AIDE,EA 15 MIN: HCPCS | Mod: HHH

## 2025-04-16 ASSESSMENT — ENCOUNTER SYMPTOMS
HIGHEST PAIN SEVERITY IN PAST 24 HOURS: 6/10
PAIN: 1
SUBJECTIVE PAIN PROGRESSION: GRADUALLY IMPROVING
PAIN SEVERITY GOAL: 0/10
PERSON REPORTING PAIN: PATIENT
LOWEST PAIN SEVERITY IN PAST 24 HOURS: 2/10

## 2025-04-16 ASSESSMENT — PAIN SCALES - PAIN ASSESSMENT IN ADVANCED DEMENTIA (PAINAD)
NEGVOCALIZATION: 0 - NONE.
FACIALEXPRESSION: 0
BODYLANGUAGE: 0
CONSOLABILITY: 0 - NO NEED TO CONSOLE.
BODYLANGUAGE: 0 - RELAXED.
TOTALSCORE: 0
NEGVOCALIZATION: 0
FACIALEXPRESSION: 0 - SMILING OR INEXPRESSIVE.
CONSOLABILITY: 0
BREATHING: 0

## 2025-04-16 ASSESSMENT — ACTIVITIES OF DAILY LIVING (ADL)
DRESSING_LB_CURRENT_FUNCTION: CONTACT GUARD ASSIST
TOILETING: CONTACT GUARD ASSIST
TOILETING: 1
BATHING ASSESSED: 1
FEEDING_WITHIN_DEFINED_LIMITS: 1
BATHING_CURRENT_FUNCTION: CONTACT GUARD ASSIST

## 2025-04-17 ENCOUNTER — HOME CARE VISIT (OUTPATIENT)
Dept: HOME HEALTH SERVICES | Facility: HOME HEALTH | Age: 85
End: 2025-04-17
Payer: MEDICARE

## 2025-04-17 VITALS
DIASTOLIC BLOOD PRESSURE: 56 MMHG | TEMPERATURE: 96.6 F | RESPIRATION RATE: 20 BRPM | OXYGEN SATURATION: 97 % | HEART RATE: 72 BPM | SYSTOLIC BLOOD PRESSURE: 110 MMHG

## 2025-04-17 VITALS — SYSTOLIC BLOOD PRESSURE: 116 MMHG | TEMPERATURE: 97.5 F | DIASTOLIC BLOOD PRESSURE: 66 MMHG

## 2025-04-17 PROCEDURE — G0151 HHCP-SERV OF PT,EA 15 MIN: HCPCS | Mod: HHH

## 2025-04-17 PROCEDURE — G0300 HHS/HOSPICE OF LPN EA 15 MIN: HCPCS | Mod: HHH

## 2025-04-17 ASSESSMENT — ENCOUNTER SYMPTOMS
DESCRIPTION OF MEMORY LOSS: LONG TERM
CHANGE IN APPETITE: UNCHANGED
MUSCLE WEAKNESS: 1
DENIES PAIN: 1
FATIGUE: 1
DRY SKIN: 1
FORGETFULNESS: 1
LIMITED RANGE OF MOTION: 1
DESCRIPTION OF MEMORY LOSS: SHORT TERM
APPETITE LEVEL: GOOD
AGITATION: 1

## 2025-04-17 ASSESSMENT — ACTIVITIES OF DAILY LIVING (ADL)
CURRENT_FUNCTION: STAND BY ASSIST
AMBULATION ASSISTANCE: STAND BY ASSIST

## 2025-04-18 ENCOUNTER — HOME CARE VISIT (OUTPATIENT)
Dept: HOME HEALTH SERVICES | Facility: HOME HEALTH | Age: 85
End: 2025-04-18
Payer: MEDICARE

## 2025-04-21 ENCOUNTER — HOME CARE VISIT (OUTPATIENT)
Dept: HOME HEALTH SERVICES | Facility: HOME HEALTH | Age: 85
End: 2025-04-21
Payer: MEDICARE

## 2025-04-21 VITALS
RESPIRATION RATE: 16 BRPM | DIASTOLIC BLOOD PRESSURE: 50 MMHG | OXYGEN SATURATION: 96 % | SYSTOLIC BLOOD PRESSURE: 108 MMHG | HEART RATE: 64 BPM | TEMPERATURE: 97.8 F

## 2025-04-21 PROCEDURE — G0299 HHS/HOSPICE OF RN EA 15 MIN: HCPCS | Mod: HHH

## 2025-04-21 ASSESSMENT — ENCOUNTER SYMPTOMS
DESCRIPTION OF MEMORY LOSS: IMMEDIATE
MUSCLE WEAKNESS: 1
FATIGUES EASILY: 1
APPETITE LEVEL: GOOD
FORGETFULNESS: 1
DESCRIPTION OF MEMORY LOSS: SHORT TERM
LAST BOWEL MOVEMENT: 67316
DESCRIPTION OF MEMORY LOSS: LONG TERM
HYPOTENSION: 1
FLUID RETENTION: 1
LIMITED RANGE OF MOTION: 1
CHANGE IN APPETITE: INCREASED
FATIGUE: 1

## 2025-04-21 ASSESSMENT — PAIN SCALES - PAIN ASSESSMENT IN ADVANCED DEMENTIA (PAINAD)
NEGVOCALIZATION: 1 - OCCASIONAL MOAN OR GROAN. LOW-LEVEL SPEECH WITH A NEGATIVE OR DISAPPROVING QUALITY.
FACIALEXPRESSION: 1 - SAD. FRIGHTENED. FROWN.
CONSOLABILITY: 1 - DISTRACTED OR REASSURED BY VOICE OR TOUCH.
NEGVOCALIZATION: 1
FACIALEXPRESSION: 1
TOTALSCORE: 4
CONSOLABILITY: 1
BODYLANGUAGE: 1
BREATHING: 0
BODYLANGUAGE: 1 - TENSE. DISTRESSED PACING. FIDGETING.

## 2025-04-21 ASSESSMENT — ACTIVITIES OF DAILY LIVING (ADL)
AMBULATION ASSISTANCE: STAND BY ASSIST
CURRENT_FUNCTION: STAND BY ASSIST

## 2025-04-22 ENCOUNTER — HOME CARE VISIT (OUTPATIENT)
Dept: HOME HEALTH SERVICES | Facility: HOME HEALTH | Age: 85
End: 2025-04-22
Payer: MEDICARE

## 2025-04-22 VITALS — SYSTOLIC BLOOD PRESSURE: 124 MMHG | DIASTOLIC BLOOD PRESSURE: 72 MMHG | TEMPERATURE: 98.3 F

## 2025-04-22 PROCEDURE — G0151 HHCP-SERV OF PT,EA 15 MIN: HCPCS | Mod: HHH

## 2025-04-22 ASSESSMENT — ENCOUNTER SYMPTOMS: PAIN: 1

## 2025-04-23 ENCOUNTER — HOME CARE VISIT (OUTPATIENT)
Dept: HOME HEALTH SERVICES | Facility: HOME HEALTH | Age: 85
End: 2025-04-23
Payer: MEDICARE

## 2025-04-23 VITALS — HEART RATE: 69 BPM | RESPIRATION RATE: 18 BRPM | TEMPERATURE: 96.9 F | OXYGEN SATURATION: 96 %

## 2025-04-23 VITALS
SYSTOLIC BLOOD PRESSURE: 122 MMHG | HEART RATE: 62 BPM | OXYGEN SATURATION: 98 % | TEMPERATURE: 98.6 F | RESPIRATION RATE: 16 BRPM | DIASTOLIC BLOOD PRESSURE: 58 MMHG

## 2025-04-23 PROCEDURE — G0300 HHS/HOSPICE OF LPN EA 15 MIN: HCPCS | Mod: HHH

## 2025-04-23 PROCEDURE — G0156 HHCP-SVS OF AIDE,EA 15 MIN: HCPCS | Mod: HHH

## 2025-04-23 PROCEDURE — G0152 HHCP-SERV OF OT,EA 15 MIN: HCPCS | Mod: HHH

## 2025-04-23 ASSESSMENT — PAIN SCALES - PAIN ASSESSMENT IN ADVANCED DEMENTIA (PAINAD)
CONSOLABILITY: 0
NEGVOCALIZATION: 0
BODYLANGUAGE: 0 - RELAXED.
BREATHING: 0
FACIALEXPRESSION: 0 - SMILING OR INEXPRESSIVE.
TOTALSCORE: 0
BODYLANGUAGE: 0
CONSOLABILITY: 0 - NO NEED TO CONSOLE.
NEGVOCALIZATION: 0 - NONE.
FACIALEXPRESSION: 0

## 2025-04-23 ASSESSMENT — ENCOUNTER SYMPTOMS
LAST BOWEL MOVEMENT: 67317
DENIES PAIN: 1
SUBJECTIVE PAIN PROGRESSION: GRADUALLY IMPROVING
PAIN: 1
LOWEST PAIN SEVERITY IN PAST 24 HOURS: 2/10
APPETITE LEVEL: GOOD
DESCRIPTION OF MEMORY LOSS: SHORT TERM
CHANGE IN APPETITE: UNCHANGED
HIGHEST PAIN SEVERITY IN PAST 24 HOURS: 6/10
PERSON REPORTING PAIN: PATIENT
PAIN SEVERITY GOAL: 0/10

## 2025-04-23 ASSESSMENT — ACTIVITIES OF DAILY LIVING (ADL)
BATHING ASSESSED: 1
BATHING_CURRENT_FUNCTION: CONTACT GUARD ASSIST
DRESSING_LB_CURRENT_FUNCTION: CONTACT GUARD ASSIST

## 2025-04-25 ENCOUNTER — HOME CARE VISIT (OUTPATIENT)
Dept: HOME HEALTH SERVICES | Facility: HOME HEALTH | Age: 85
End: 2025-04-25
Payer: MEDICARE

## 2025-04-25 VITALS — RESPIRATION RATE: 18 BRPM | HEART RATE: 68 BPM | OXYGEN SATURATION: 96 % | TEMPERATURE: 97.4 F

## 2025-04-25 VITALS — TEMPERATURE: 98.2 F | SYSTOLIC BLOOD PRESSURE: 114 MMHG | DIASTOLIC BLOOD PRESSURE: 64 MMHG

## 2025-04-25 VITALS — RESPIRATION RATE: 18 BRPM | TEMPERATURE: 97.5 F | HEART RATE: 64 BPM | OXYGEN SATURATION: 95 %

## 2025-04-25 PROCEDURE — G0151 HHCP-SERV OF PT,EA 15 MIN: HCPCS | Mod: HHH

## 2025-04-25 PROCEDURE — G0152 HHCP-SERV OF OT,EA 15 MIN: HCPCS | Mod: HHH

## 2025-04-25 ASSESSMENT — PAIN SCALES - PAIN ASSESSMENT IN ADVANCED DEMENTIA (PAINAD)
NEGVOCALIZATION: 0 - NONE.
BODYLANGUAGE: 0
FACIALEXPRESSION: 0 - SMILING OR INEXPRESSIVE.
FACIALEXPRESSION: 0
CONSOLABILITY: 0
BODYLANGUAGE: 0 - RELAXED.
BODYLANGUAGE: 0 - RELAXED.
BREATHING: 0
TOTALSCORE: 0
CONSOLABILITY: 0
FACIALEXPRESSION: 0
FACIALEXPRESSION: 0 - SMILING OR INEXPRESSIVE.
NEGVOCALIZATION: 0
CONSOLABILITY: 0 - NO NEED TO CONSOLE.
BREATHING: 0
BODYLANGUAGE: 0
NEGVOCALIZATION: 0 - NONE.
CONSOLABILITY: 0 - NO NEED TO CONSOLE.
TOTALSCORE: 0
NEGVOCALIZATION: 0

## 2025-04-25 ASSESSMENT — ENCOUNTER SYMPTOMS
PAIN SEVERITY GOAL: 0/10
PERSON REPORTING PAIN: PATIENT
PAIN SEVERITY GOAL: 0/10
LOWEST PAIN SEVERITY IN PAST 24 HOURS: 2/10
PERSON REPORTING PAIN: PATIENT
PAIN: 1
SUBJECTIVE PAIN PROGRESSION: GRADUALLY IMPROVING
HIGHEST PAIN SEVERITY IN PAST 24 HOURS: 6/10
HIGHEST PAIN SEVERITY IN PAST 24 HOURS: 6/10
LOWEST PAIN SEVERITY IN PAST 24 HOURS: 2/10
PAIN: 1
SUBJECTIVE PAIN PROGRESSION: GRADUALLY IMPROVING

## 2025-04-25 ASSESSMENT — ACTIVITIES OF DAILY LIVING (ADL)
BATHING ASSESSED: 1
BATHING ASSESSED: 1
DRESSING_LB_CURRENT_FUNCTION: CONTACT GUARD ASSIST
TOILETING: 1
TOILETING: CONTACT GUARD ASSIST
BATHING_CURRENT_FUNCTION: CONTACT GUARD ASSIST
BATHING_CURRENT_FUNCTION: CONTACT GUARD ASSIST
DRESSING_LB_CURRENT_FUNCTION: CONTACT GUARD ASSIST
TOILETING: 1
TOILETING: CONTACT GUARD ASSIST

## 2025-04-28 ENCOUNTER — HOME CARE VISIT (OUTPATIENT)
Dept: HOME HEALTH SERVICES | Facility: HOME HEALTH | Age: 85
End: 2025-04-28
Payer: MEDICARE

## 2025-04-28 VITALS
OXYGEN SATURATION: 97 % | DIASTOLIC BLOOD PRESSURE: 52 MMHG | RESPIRATION RATE: 20 BRPM | SYSTOLIC BLOOD PRESSURE: 112 MMHG | HEART RATE: 68 BPM | TEMPERATURE: 97.8 F

## 2025-04-28 VITALS — SYSTOLIC BLOOD PRESSURE: 124 MMHG | DIASTOLIC BLOOD PRESSURE: 64 MMHG | TEMPERATURE: 99.7 F

## 2025-04-28 VITALS — HEART RATE: 75 BPM | TEMPERATURE: 97.1 F | RESPIRATION RATE: 18 BRPM | OXYGEN SATURATION: 97 %

## 2025-04-28 PROCEDURE — G0151 HHCP-SERV OF PT,EA 15 MIN: HCPCS | Mod: HHH

## 2025-04-28 PROCEDURE — G0299 HHS/HOSPICE OF RN EA 15 MIN: HCPCS | Mod: HHH

## 2025-04-28 PROCEDURE — G0152 HHCP-SERV OF OT,EA 15 MIN: HCPCS | Mod: HHH

## 2025-04-28 ASSESSMENT — PAIN SCALES - PAIN ASSESSMENT IN ADVANCED DEMENTIA (PAINAD)
NEGVOCALIZATION: 0 - NONE.
FACIALEXPRESSION: 0
NEGVOCALIZATION: 0
BODYLANGUAGE: 0
CONSOLABILITY: 0 - NO NEED TO CONSOLE.
FACIALEXPRESSION: 0 - SMILING OR INEXPRESSIVE.
BODYLANGUAGE: 0 - RELAXED.
TOTALSCORE: 0
CONSOLABILITY: 0
BREATHING: 0

## 2025-04-28 ASSESSMENT — ENCOUNTER SYMPTOMS
FORGETFULNESS: 1
PAIN LOCATION: LEFT SHOULDER
OCCASIONAL FEELINGS OF UNSTEADINESS: 1
APPETITE LEVEL: GOOD
FATIGUE: 1
CHANGE IN APPETITE: UNCHANGED
PAIN: 1
PERSON REPORTING PAIN: PATIENT
DRY SKIN: 1
PAIN LOCATION - RELIEVING FACTORS: REST
STOOL FREQUENCY: DAILY
MUSCLE WEAKNESS: 1
PAIN LOCATION - EXACERBATING FACTORS: MOVEMENTS
DENIES PAIN: 1
PERSON REPORTING PAIN: PATIENT
SUBJECTIVE PAIN PROGRESSION: UNCHANGED
DEPRESSION: 0
DESCRIPTION OF MEMORY LOSS: LONG TERM
LOSS OF SENSATION IN FEET: 0
DESCRIPTION OF MEMORY LOSS: SHORT TERM
BOWEL PATTERN NORMAL: 1
LAST BOWEL MOVEMENT: 67323
AGITATION: 1

## 2025-04-28 ASSESSMENT — ACTIVITIES OF DAILY LIVING (ADL)
TOILETING: 1
BATHING_CURRENT_FUNCTION: MINIMUM ASSIST
MONEY MANAGEMENT (EXPENSES/BILLS): TOTALLY DEPENDENT
AMBULATION ASSISTANCE: 1
TOILETING: MINIMUM ASSIST
DRESSING_LB_CURRENT_FUNCTION: MINIMUM ASSIST
BATHING ASSESSED: 1
AMBULATION ASSISTANCE: ONE PERSON

## 2025-04-30 ENCOUNTER — HOME CARE VISIT (OUTPATIENT)
Dept: HOME HEALTH SERVICES | Facility: HOME HEALTH | Age: 85
End: 2025-04-30
Payer: MEDICARE

## 2025-04-30 VITALS
DIASTOLIC BLOOD PRESSURE: 58 MMHG | OXYGEN SATURATION: 98 % | SYSTOLIC BLOOD PRESSURE: 116 MMHG | RESPIRATION RATE: 16 BRPM | TEMPERATURE: 97.8 F | HEART RATE: 76 BPM

## 2025-04-30 VITALS — DIASTOLIC BLOOD PRESSURE: 52 MMHG | SYSTOLIC BLOOD PRESSURE: 111 MMHG

## 2025-04-30 PROCEDURE — G0158 HHC OT ASSISTANT EA 15: HCPCS | Mod: CO,HHH

## 2025-04-30 PROCEDURE — G0300 HHS/HOSPICE OF LPN EA 15 MIN: HCPCS | Mod: HHH

## 2025-04-30 PROCEDURE — G0156 HHCP-SVS OF AIDE,EA 15 MIN: HCPCS | Mod: HHH

## 2025-04-30 ASSESSMENT — ENCOUNTER SYMPTOMS
PAIN LOCATION: LEFT ARM
SUBJECTIVE PAIN PROGRESSION: UNCHANGED
LAST BOWEL MOVEMENT: 67325
DESCRIPTION OF MEMORY LOSS: LONG TERM
CHANGE IN APPETITE: UNCHANGED
LOWEST PAIN SEVERITY IN PAST 24 HOURS: 3/10
PAIN SEVERITY GOAL: 0/10
PAIN: 1
HIGHEST PAIN SEVERITY IN PAST 24 HOURS: 3/10
APPETITE LEVEL: GOOD

## 2025-05-01 ENCOUNTER — HOME CARE VISIT (OUTPATIENT)
Dept: HOME HEALTH SERVICES | Facility: HOME HEALTH | Age: 85
End: 2025-05-01
Payer: MEDICARE

## 2025-05-01 VITALS — DIASTOLIC BLOOD PRESSURE: 70 MMHG | TEMPERATURE: 96.9 F | SYSTOLIC BLOOD PRESSURE: 130 MMHG

## 2025-05-01 PROCEDURE — G0151 HHCP-SERV OF PT,EA 15 MIN: HCPCS | Mod: HHH

## 2025-05-01 ASSESSMENT — ENCOUNTER SYMPTOMS
PERSON REPORTING PAIN: PATIENT
SUBJECTIVE PAIN PROGRESSION: GRADUALLY IMPROVING
PAIN: 1

## 2025-05-03 ASSESSMENT — ACTIVITIES OF DAILY LIVING (ADL)
HAIR_CARE_ASSESSED: 1
HAIR_CARE_STANDBY_ASSIST: 1

## 2025-05-05 ENCOUNTER — HOME CARE VISIT (OUTPATIENT)
Dept: HOME HEALTH SERVICES | Facility: HOME HEALTH | Age: 85
End: 2025-05-05
Payer: MEDICARE

## 2025-05-05 VITALS
TEMPERATURE: 98.9 F | DIASTOLIC BLOOD PRESSURE: 62 MMHG | RESPIRATION RATE: 16 BRPM | SYSTOLIC BLOOD PRESSURE: 122 MMHG | OXYGEN SATURATION: 97 % | HEART RATE: 72 BPM

## 2025-05-05 PROCEDURE — G0158 HHC OT ASSISTANT EA 15: HCPCS | Mod: CO,HHH

## 2025-05-05 PROCEDURE — G0300 HHS/HOSPICE OF LPN EA 15 MIN: HCPCS | Mod: HHH

## 2025-05-05 ASSESSMENT — ENCOUNTER SYMPTOMS
APPETITE LEVEL: GOOD
DESCRIPTION OF MEMORY LOSS: LONG TERM
CHANGE IN APPETITE: UNCHANGED
DENIES PAIN: 1
LAST BOWEL MOVEMENT: 67330

## 2025-05-05 ASSESSMENT — ACTIVITIES OF DAILY LIVING (ADL): AMBULATION ASSISTANCE: STAND BY ASSIST

## 2025-05-06 VITALS — DIASTOLIC BLOOD PRESSURE: 76 MMHG | SYSTOLIC BLOOD PRESSURE: 125 MMHG | OXYGEN SATURATION: 99 %

## 2025-05-06 ASSESSMENT — ENCOUNTER SYMPTOMS: DENIES PAIN: 1

## 2025-05-06 ASSESSMENT — ACTIVITIES OF DAILY LIVING (ADL)
TOILETING: MODERATE ASSIST
BATHING_CURRENT_FUNCTION: MODERATE ASSIST
PHYSICAL TRANSFERS ASSESSED: 1
CURRENT_FUNCTION: CONTACT GUARD ASSIST
BATHING ASSESSED: 1
DRESSING_UB_CURRENT_FUNCTION: MODERATE ASSIST
TOILETING: 1

## 2025-05-07 ENCOUNTER — HOME CARE VISIT (OUTPATIENT)
Dept: HOME HEALTH SERVICES | Facility: HOME HEALTH | Age: 85
End: 2025-05-07
Payer: MEDICARE

## 2025-05-07 VITALS
RESPIRATION RATE: 16 BRPM | DIASTOLIC BLOOD PRESSURE: 68 MMHG | TEMPERATURE: 98.7 F | HEART RATE: 70 BPM | SYSTOLIC BLOOD PRESSURE: 120 MMHG | OXYGEN SATURATION: 99 %

## 2025-05-07 PROCEDURE — G0156 HHCP-SVS OF AIDE,EA 15 MIN: HCPCS | Mod: HHH

## 2025-05-07 PROCEDURE — G0300 HHS/HOSPICE OF LPN EA 15 MIN: HCPCS | Mod: HHH

## 2025-05-07 ASSESSMENT — ACTIVITIES OF DAILY LIVING (ADL): AMBULATION ASSISTANCE: STAND BY ASSIST

## 2025-05-07 ASSESSMENT — ENCOUNTER SYMPTOMS
CHANGE IN APPETITE: UNCHANGED
DESCRIPTION OF MEMORY LOSS: LONG TERM
DENIES PAIN: 1
APPETITE LEVEL: GOOD
LAST BOWEL MOVEMENT: 67332

## 2025-05-08 ENCOUNTER — HOSPITAL ENCOUNTER (OUTPATIENT)
Dept: RADIOLOGY | Facility: HOSPITAL | Age: 85
Discharge: HOME | End: 2025-05-08
Payer: MEDICARE

## 2025-05-08 ENCOUNTER — HOME CARE VISIT (OUTPATIENT)
Dept: HOME HEALTH SERVICES | Facility: HOME HEALTH | Age: 85
End: 2025-05-08
Payer: MEDICARE

## 2025-05-08 DIAGNOSIS — S46.002D INJURY OF LEFT ROTATOR CUFF, SUBSEQUENT ENCOUNTER: ICD-10-CM

## 2025-05-08 DIAGNOSIS — S46.212A TRAUMATIC PARTIAL TEAR OF LEFT BICEPS TENDON, INITIAL ENCOUNTER: ICD-10-CM

## 2025-05-08 DIAGNOSIS — S46.012D TRAUMATIC INCOMPLETE TEAR OF LEFT ROTATOR CUFF, SUBSEQUENT ENCOUNTER: Primary | ICD-10-CM

## 2025-05-08 PROBLEM — S46.012A TRAUMATIC INCOMPLETE TEAR OF LEFT ROTATOR CUFF: Status: ACTIVE | Noted: 2025-05-08

## 2025-05-08 PROCEDURE — 73221 MRI JOINT UPR EXTREM W/O DYE: CPT | Mod: LT

## 2025-05-08 PROCEDURE — G0158 HHC OT ASSISTANT EA 15: HCPCS | Mod: CO,HHH

## 2025-05-10 VITALS — TEMPERATURE: 96.7 F | DIASTOLIC BLOOD PRESSURE: 74 MMHG | OXYGEN SATURATION: 98 % | SYSTOLIC BLOOD PRESSURE: 125 MMHG

## 2025-05-10 ASSESSMENT — ACTIVITIES OF DAILY LIVING (ADL)
DRESSING_LB_CURRENT_FUNCTION: MODERATE ASSIST
AMBULATION ASSISTANCE: STAND BY ASSIST
DRESSING_UB_CURRENT_FUNCTION: MODERATE ASSIST
TOILETING: MINIMUM ASSIST
CURRENT_FUNCTION: CONTACT GUARD ASSIST
AMBULATION ASSISTANCE: 1
TOILETING: 1
AMBULATION ASSISTANCE: CONTACT GUARD ASSIST
PHYSICAL TRANSFERS ASSESSED: 1

## 2025-05-10 ASSESSMENT — ENCOUNTER SYMPTOMS: DENIES PAIN: 1

## 2025-05-12 ENCOUNTER — OFFICE VISIT (OUTPATIENT)
Dept: ORTHOPEDIC SURGERY | Facility: HOSPITAL | Age: 85
End: 2025-05-12
Payer: MEDICARE

## 2025-05-12 ENCOUNTER — HOSPITAL ENCOUNTER (OUTPATIENT)
Dept: RADIOLOGY | Facility: HOSPITAL | Age: 85
Discharge: HOME | End: 2025-05-12
Payer: MEDICARE

## 2025-05-12 VITALS — WEIGHT: 138 LBS | BODY MASS INDEX: 27.09 KG/M2 | HEIGHT: 60 IN

## 2025-05-12 DIAGNOSIS — S46.012D TRAUMATIC INCOMPLETE TEAR OF LEFT ROTATOR CUFF, SUBSEQUENT ENCOUNTER: ICD-10-CM

## 2025-05-12 DIAGNOSIS — S46.212A TRAUMATIC PARTIAL TEAR OF LEFT BICEPS TENDON, INITIAL ENCOUNTER: ICD-10-CM

## 2025-05-12 DIAGNOSIS — M25.512 LEFT SHOULDER PAIN, UNSPECIFIED CHRONICITY: ICD-10-CM

## 2025-05-12 PROCEDURE — 99203 OFFICE O/P NEW LOW 30 MIN: CPT | Performed by: ORTHOPAEDIC SURGERY

## 2025-05-12 PROCEDURE — 99213 OFFICE O/P EST LOW 20 MIN: CPT | Performed by: ORTHOPAEDIC SURGERY

## 2025-05-12 PROCEDURE — 73030 X-RAY EXAM OF SHOULDER: CPT | Mod: LEFT SIDE | Performed by: RADIOLOGY

## 2025-05-12 PROCEDURE — 73030 X-RAY EXAM OF SHOULDER: CPT | Mod: LT

## 2025-05-12 PROCEDURE — 1159F MED LIST DOCD IN RCRD: CPT | Performed by: ORTHOPAEDIC SURGERY

## 2025-05-12 RX ORDER — MELOXICAM 7.5 MG/1
7.5 TABLET ORAL DAILY
Qty: 10 TABLET | Refills: 0 | Status: SHIPPED | OUTPATIENT
Start: 2025-05-12 | End: 2025-05-22

## 2025-05-12 ASSESSMENT — PAIN SCALES - GENERAL: PAINLEVEL_OUTOF10: 5 - MODERATE PAIN

## 2025-05-12 ASSESSMENT — PAIN - FUNCTIONAL ASSESSMENT: PAIN_FUNCTIONAL_ASSESSMENT: 0-10

## 2025-05-12 NOTE — PROGRESS NOTES
Patient was found down and sustained injury to her left shoulder she has been recovering gradually and regaining function in her shoulder but continues to have persistent pain over the deltoid anterior shoulder no referred pain down her arm no neck pain.    The patient is pleasant and cooperative.  The patient is alert and oriented ×3.  Auditory function is intact.  The patient is a fair historian she is accompanied by care giver who is assisted with her history.  The patient is not in acute distress.  Eye exam significant for nonicteric sclera, intact ocular muscle movement.  Breathing is rhythmic symmetric and nonlabored.  Left shoulder integument intact no lesions scars laceration abrasion contusions left radial pulse palpable brisk cap refill light touch sensation intact  strength 5/5 motor power in shoulder abduction internal/external rotation is all 5/5 with pain in resisted internal rotation.  There is also pain on passive elevation above 90 degrees and limited elevation about to 100 degrees.  No apprehension.    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    Glenohumeral arthritis with acute exacerbation    We discussed significance of the findings of the MRI scan and the x-rays and treatment options for glenohumeral arthritis.  I do not recommend a specific surgical intervention at this time.  I do recommend the patient try an anti-inflammatory medicine we long discussion about her anti-inflammatory history and apparently she has taken ibuprofen and similar drugs without difficulty but she does have an allergy listed to aspirin.  I did recommend we start with anti-inflammatory medicine at low-dose specifically meloxicam 7.5 mg was prescribed today and the patient and her caregiver have been asked to watch closely for any itching or other signs of an allergenic reaction.  If she is tolerating the  medication well I would like them to call to check in with us next week.    This was dictated using voice recognition software and not corrected for grammatical or spelling errors.

## 2025-05-13 ENCOUNTER — HOME CARE VISIT (OUTPATIENT)
Dept: HOME HEALTH SERVICES | Facility: HOME HEALTH | Age: 85
End: 2025-05-13
Payer: MEDICARE

## 2025-05-13 VITALS — DIASTOLIC BLOOD PRESSURE: 77 MMHG | HEART RATE: 70 BPM | SYSTOLIC BLOOD PRESSURE: 122 MMHG | OXYGEN SATURATION: 96 %

## 2025-05-13 PROCEDURE — G0158 HHC OT ASSISTANT EA 15: HCPCS | Mod: CO,HHH

## 2025-05-14 ENCOUNTER — HOME CARE VISIT (OUTPATIENT)
Dept: HOME HEALTH SERVICES | Facility: HOME HEALTH | Age: 85
End: 2025-05-14
Payer: MEDICARE

## 2025-05-14 VITALS
TEMPERATURE: 97.8 F | RESPIRATION RATE: 16 BRPM | HEART RATE: 74 BPM | SYSTOLIC BLOOD PRESSURE: 130 MMHG | DIASTOLIC BLOOD PRESSURE: 62 MMHG | OXYGEN SATURATION: 98 %

## 2025-05-14 PROCEDURE — G0300 HHS/HOSPICE OF LPN EA 15 MIN: HCPCS | Mod: HHH

## 2025-05-14 PROCEDURE — G0156 HHCP-SVS OF AIDE,EA 15 MIN: HCPCS | Mod: HHH

## 2025-05-14 ASSESSMENT — ENCOUNTER SYMPTOMS
CHANGE IN APPETITE: UNCHANGED
APPETITE LEVEL: GOOD
LAST BOWEL MOVEMENT: 67338
DENIES PAIN: 1
DESCRIPTION OF MEMORY LOSS: LONG TERM

## 2025-05-14 ASSESSMENT — ACTIVITIES OF DAILY LIVING (ADL)
TOILETING: CONTACT GUARD ASSIST
PHYSICAL TRANSFERS ASSESSED: 1
TOILETING: 1
CURRENT_FUNCTION: CONTACT GUARD ASSIST

## 2025-05-16 ENCOUNTER — APPOINTMENT (OUTPATIENT)
Dept: HOME HEALTH SERVICES | Facility: HOME HEALTH | Age: 85
End: 2025-05-16
Payer: MEDICARE

## 2025-05-16 ENCOUNTER — HOME CARE VISIT (OUTPATIENT)
Dept: HOME HEALTH SERVICES | Facility: HOME HEALTH | Age: 85
End: 2025-05-16
Payer: MEDICARE

## 2025-05-16 VITALS
OXYGEN SATURATION: 98 % | SYSTOLIC BLOOD PRESSURE: 122 MMHG | DIASTOLIC BLOOD PRESSURE: 62 MMHG | RESPIRATION RATE: 16 BRPM | TEMPERATURE: 98.6 F | HEART RATE: 72 BPM

## 2025-05-16 PROCEDURE — G0300 HHS/HOSPICE OF LPN EA 15 MIN: HCPCS | Mod: HHH

## 2025-05-16 ASSESSMENT — ENCOUNTER SYMPTOMS
APPETITE LEVEL: GOOD
CHANGE IN APPETITE: UNCHANGED
DENIES PAIN: 1
LAST BOWEL MOVEMENT: 67341
FORGETFULNESS: 1

## 2025-05-20 ENCOUNTER — HOME CARE VISIT (OUTPATIENT)
Dept: HOME HEALTH SERVICES | Facility: HOME HEALTH | Age: 85
End: 2025-05-20
Payer: MEDICARE

## 2025-05-20 PROCEDURE — G0158 HHC OT ASSISTANT EA 15: HCPCS | Mod: CO,HHH

## 2025-05-21 ENCOUNTER — HOME CARE VISIT (OUTPATIENT)
Dept: HOME HEALTH SERVICES | Facility: HOME HEALTH | Age: 85
End: 2025-05-21
Payer: MEDICARE

## 2025-05-21 VITALS
OXYGEN SATURATION: 99 % | HEART RATE: 72 BPM | RESPIRATION RATE: 16 BRPM | SYSTOLIC BLOOD PRESSURE: 116 MMHG | TEMPERATURE: 98.9 F | DIASTOLIC BLOOD PRESSURE: 62 MMHG

## 2025-05-21 VITALS — HEART RATE: 70 BPM | TEMPERATURE: 97.6 F | OXYGEN SATURATION: 99 %

## 2025-05-21 PROCEDURE — G0300 HHS/HOSPICE OF LPN EA 15 MIN: HCPCS | Mod: HHH

## 2025-05-21 PROCEDURE — G0156 HHCP-SVS OF AIDE,EA 15 MIN: HCPCS | Mod: HHH

## 2025-05-21 ASSESSMENT — ACTIVITIES OF DAILY LIVING (ADL)
TOILETING: 1
TOILETING: MINIMUM ASSIST
CURRENT_FUNCTION: STAND BY ASSIST
PHYSICAL TRANSFERS ASSESSED: 1
CURRENT_FUNCTION: CONTACT GUARD ASSIST

## 2025-05-21 ASSESSMENT — ENCOUNTER SYMPTOMS
DENIES PAIN: 1
APPETITE LEVEL: GOOD
FORGETFULNESS: 1
PAIN: 1
CHANGE IN APPETITE: UNCHANGED
LAST BOWEL MOVEMENT: 67345
PAIN LOCATION: LEFT SHOULDER

## 2025-05-22 ENCOUNTER — TELEPHONE (OUTPATIENT)
Dept: ORTHOPEDIC SURGERY | Facility: HOSPITAL | Age: 85
End: 2025-05-22
Payer: MEDICARE

## 2025-05-22 ENCOUNTER — HOME CARE VISIT (OUTPATIENT)
Dept: HOME HEALTH SERVICES | Facility: HOME HEALTH | Age: 85
End: 2025-05-22
Payer: MEDICARE

## 2025-05-22 VITALS
OXYGEN SATURATION: 96 % | TEMPERATURE: 97.3 F | DIASTOLIC BLOOD PRESSURE: 62 MMHG | HEART RATE: 62 BPM | SYSTOLIC BLOOD PRESSURE: 120 MMHG | RESPIRATION RATE: 18 BRPM

## 2025-05-22 DIAGNOSIS — M25.512 LEFT SHOULDER PAIN, UNSPECIFIED CHRONICITY: Primary | ICD-10-CM

## 2025-05-22 PROCEDURE — G0152 HHCP-SERV OF OT,EA 15 MIN: HCPCS | Mod: HHH

## 2025-05-22 ASSESSMENT — PAIN SCALES - PAIN ASSESSMENT IN ADVANCED DEMENTIA (PAINAD)
CONSOLABILITY: 0
FACIALEXPRESSION: 0
NEGVOCALIZATION: 0
NEGVOCALIZATION: 0 - NONE.
CONSOLABILITY: 0 - NO NEED TO CONSOLE.
BREATHING: 0
BODYLANGUAGE: 0
TOTALSCORE: 0
BODYLANGUAGE: 0 - RELAXED.
FACIALEXPRESSION: 0 - SMILING OR INEXPRESSIVE.

## 2025-05-22 ASSESSMENT — ENCOUNTER SYMPTOMS
PAIN SEVERITY GOAL: 0/10
HIGHEST PAIN SEVERITY IN PAST 24 HOURS: 2/10
SUBJECTIVE PAIN PROGRESSION: GRADUALLY IMPROVING
LOWEST PAIN SEVERITY IN PAST 24 HOURS: 0/10
PAIN: 1
PERSON REPORTING PAIN: PATIENT

## 2025-05-22 ASSESSMENT — ACTIVITIES OF DAILY LIVING (ADL)
TOILETING: CONTACT GUARD ASSIST
TOILETING: 1
BATHING ASSESSED: 1
DRESSING_LB_CURRENT_FUNCTION: CONTACT GUARD ASSIST
BATHING_CURRENT_FUNCTION: CONTACT GUARD ASSIST

## 2025-05-22 NOTE — TELEPHONE ENCOUNTER
Marya called and left message the Barbara is doing well on the mobic 7.5 mg, no reaction with aspirin allergy, less pain and using arm more.   Asking for refill.  Please advise.

## 2025-05-23 ENCOUNTER — HOME CARE VISIT (OUTPATIENT)
Dept: HOME HEALTH SERVICES | Facility: HOME HEALTH | Age: 85
End: 2025-05-23
Payer: MEDICARE

## 2025-05-23 VITALS
DIASTOLIC BLOOD PRESSURE: 62 MMHG | SYSTOLIC BLOOD PRESSURE: 118 MMHG | HEART RATE: 62 BPM | OXYGEN SATURATION: 97 % | RESPIRATION RATE: 18 BRPM | TEMPERATURE: 98 F

## 2025-05-23 PROCEDURE — G0300 HHS/HOSPICE OF LPN EA 15 MIN: HCPCS | Mod: HHH

## 2025-05-23 RX ORDER — MELOXICAM 7.5 MG/1
7.5 TABLET ORAL DAILY
Qty: 14 TABLET | Refills: 0 | Status: SHIPPED | OUTPATIENT
Start: 2025-05-23 | End: 2025-06-06

## 2025-05-23 ASSESSMENT — ENCOUNTER SYMPTOMS
DESCRIPTION OF MEMORY LOSS: SHORT TERM
FORGETFULNESS: 1
DENIES PAIN: 1
LAST BOWEL MOVEMENT: 67347
DESCRIPTION OF MEMORY LOSS: LONG TERM
CHANGE IN APPETITE: UNCHANGED
APPETITE LEVEL: GOOD

## 2025-05-23 ASSESSMENT — ACTIVITIES OF DAILY LIVING (ADL): AMBULATION ASSISTANCE: STAND BY ASSIST

## 2025-05-27 ENCOUNTER — HOME CARE VISIT (OUTPATIENT)
Dept: HOME HEALTH SERVICES | Facility: HOME HEALTH | Age: 85
End: 2025-05-27
Payer: MEDICARE

## 2025-05-27 PROCEDURE — G0158 HHC OT ASSISTANT EA 15: HCPCS | Mod: CO,HHH

## 2025-05-27 NOTE — TELEPHONE ENCOUNTER
Marya informed that 14 sent and of Barbara doing well that long term prescriptions can be discussed with  PCP!

## 2025-05-28 ENCOUNTER — HOME CARE VISIT (OUTPATIENT)
Dept: HOME HEALTH SERVICES | Facility: HOME HEALTH | Age: 85
End: 2025-05-28
Payer: MEDICARE

## 2025-05-28 VITALS — DIASTOLIC BLOOD PRESSURE: 62 MMHG | SYSTOLIC BLOOD PRESSURE: 116 MMHG

## 2025-05-28 VITALS
TEMPERATURE: 98.8 F | OXYGEN SATURATION: 98 % | RESPIRATION RATE: 16 BRPM | HEART RATE: 62 BPM | SYSTOLIC BLOOD PRESSURE: 120 MMHG | DIASTOLIC BLOOD PRESSURE: 62 MMHG

## 2025-05-28 PROCEDURE — G0300 HHS/HOSPICE OF LPN EA 15 MIN: HCPCS | Mod: HHH

## 2025-05-29 ENCOUNTER — HOME CARE VISIT (OUTPATIENT)
Dept: HOME HEALTH SERVICES | Facility: HOME HEALTH | Age: 85
End: 2025-05-29
Payer: MEDICARE

## 2025-05-29 VITALS
HEART RATE: 66 BPM | RESPIRATION RATE: 18 BRPM | TEMPERATURE: 97.7 F | SYSTOLIC BLOOD PRESSURE: 118 MMHG | OXYGEN SATURATION: 96 % | DIASTOLIC BLOOD PRESSURE: 66 MMHG

## 2025-05-29 VITALS
SYSTOLIC BLOOD PRESSURE: 118 MMHG | DIASTOLIC BLOOD PRESSURE: 60 MMHG | OXYGEN SATURATION: 95 % | RESPIRATION RATE: 20 BRPM | HEART RATE: 68 BPM | TEMPERATURE: 98.3 F

## 2025-05-29 PROCEDURE — G0152 HHCP-SERV OF OT,EA 15 MIN: HCPCS | Mod: HHH

## 2025-05-29 PROCEDURE — G0299 HHS/HOSPICE OF RN EA 15 MIN: HCPCS | Mod: HHH

## 2025-05-29 ASSESSMENT — ENCOUNTER SYMPTOMS
DEPRESSION: 0
DESCRIPTION OF MEMORY LOSS: SHORT TERM
DENIES PAIN: 1
SUBJECTIVE PAIN PROGRESSION: GRADUALLY IMPROVING
CHANGE IN APPETITE: UNCHANGED
OCCASIONAL FEELINGS OF UNSTEADINESS: 1
HIGHEST PAIN SEVERITY IN PAST 24 HOURS: 6/10
CHANGE IN APPETITE: UNCHANGED
STOOL FREQUENCY: DAILY
PAIN: 1
LOWEST PAIN SEVERITY IN PAST 24 HOURS: 2/10
LAST BOWEL MOVEMENT: 67354
BOWEL PATTERN NORMAL: 1
DESCRIPTION OF MEMORY LOSS: LONG TERM
DIZZINESS: 1
APPETITE LEVEL: GOOD
LAST BOWEL MOVEMENT: 67354
DRY SKIN: 1
LOSS OF SENSATION IN FEET: 0
DEPRESSION: 0
LOSS OF SENSATION IN FEET: 0
PERSON REPORTING PAIN: PATIENT
DENIES PAIN: 1
OCCASIONAL FEELINGS OF UNSTEADINESS: 1
MUSCLE WEAKNESS: 1
PAIN SEVERITY GOAL: 0/10
APPETITE LEVEL: GOOD
FATIGUE: 1
FORGETFULNESS: 1
FORGETFULNESS: 1
PERSON REPORTING PAIN: PATIENT

## 2025-05-29 ASSESSMENT — ACTIVITIES OF DAILY LIVING (ADL)
ENTERING_EXITING_HOME: MODERATE ASSIST
AMBULATION ASSISTANCE: ONE PERSON
DRESSING_LB_CURRENT_FUNCTION: CONTACT GUARD ASSIST
CURRENT_FUNCTION: ONE PERSON
OASIS_M1830: 03
BATHING_CURRENT_FUNCTION: CONTACT GUARD ASSIST
BATHING ASSESSED: 1
TOILETING: CONTACT GUARD ASSIST
ENTERING_EXITING_HOME: MINIMUM ASSIST
GROOMING_COMMENTS: CAREGIVER ASSISTANCE
TOILETING: 1
FEEDING_COMMENTS: SET UP
BATHING_COMMENTS: CAREGIVER ASSISTANCE
OASIS_M1830: 03

## 2025-05-29 ASSESSMENT — PAIN SCALES - PAIN ASSESSMENT IN ADVANCED DEMENTIA (PAINAD)
BODYLANGUAGE: 0 - RELAXED.
NEGVOCALIZATION: 0 - NONE.
NEGVOCALIZATION: 0
CONSOLABILITY: 0
BREATHING: 0
FACIALEXPRESSION: 0 - SMILING OR INEXPRESSIVE.
BODYLANGUAGE: 0
FACIALEXPRESSION: 0
CONSOLABILITY: 0 - NO NEED TO CONSOLE.
TOTALSCORE: 0

## 2025-06-02 ENCOUNTER — TELEPHONE (OUTPATIENT)
Dept: PRIMARY CARE | Facility: CLINIC | Age: 85
End: 2025-06-02
Payer: MEDICARE

## 2025-06-02 NOTE — TELEPHONE ENCOUNTER
Dr[facility]enez [facility] left a voice mail message stating she faxed POC orders over on 5/30 need them signed by Thursday.

## 2025-06-03 ENCOUNTER — HOME CARE VISIT (OUTPATIENT)
Dept: HOME HEALTH SERVICES | Facility: HOME HEALTH | Age: 85
End: 2025-06-03
Payer: MEDICARE

## 2025-06-03 VITALS
HEART RATE: 64 BPM | RESPIRATION RATE: 16 BRPM | DIASTOLIC BLOOD PRESSURE: 62 MMHG | OXYGEN SATURATION: 98 % | TEMPERATURE: 98.7 F | SYSTOLIC BLOOD PRESSURE: 120 MMHG

## 2025-06-03 VITALS — DIASTOLIC BLOOD PRESSURE: 60 MMHG | OXYGEN SATURATION: 99 % | SYSTOLIC BLOOD PRESSURE: 115 MMHG

## 2025-06-03 PROCEDURE — G0300 HHS/HOSPICE OF LPN EA 15 MIN: HCPCS | Mod: HHH

## 2025-06-03 PROCEDURE — G0158 HHC OT ASSISTANT EA 15: HCPCS | Mod: CO,HHH

## 2025-06-03 ASSESSMENT — ENCOUNTER SYMPTOMS: DENIES PAIN: 1

## 2025-06-04 ENCOUNTER — HOME CARE VISIT (OUTPATIENT)
Dept: HOME HEALTH SERVICES | Facility: HOME HEALTH | Age: 85
End: 2025-06-04
Payer: MEDICARE

## 2025-06-04 ASSESSMENT — ENCOUNTER SYMPTOMS
CHANGE IN APPETITE: UNCHANGED
FORGETFULNESS: 1
APPETITE LEVEL: GOOD
DENIES PAIN: 1
LAST BOWEL MOVEMENT: 67359

## 2025-06-05 ENCOUNTER — TELEPHONE (OUTPATIENT)
Dept: PRIMARY CARE | Facility: CLINIC | Age: 85
End: 2025-06-05
Payer: MEDICARE

## 2025-06-05 NOTE — TELEPHONE ENCOUNTER
Dr. Oli Horne left a voicemail message stating she have a few questions regarding the prescriptions call her back at [phone].

## 2025-06-07 ENCOUNTER — HOME CARE VISIT (OUTPATIENT)
Dept: HOME HEALTH SERVICES | Facility: HOME HEALTH | Age: 85
End: 2025-06-07
Payer: MEDICARE

## 2025-06-07 VITALS
TEMPERATURE: 97.5 F | OXYGEN SATURATION: 96 % | RESPIRATION RATE: 16 BRPM | HEART RATE: 60 BPM | DIASTOLIC BLOOD PRESSURE: 62 MMHG | SYSTOLIC BLOOD PRESSURE: 130 MMHG

## 2025-06-07 PROCEDURE — G0300 HHS/HOSPICE OF LPN EA 15 MIN: HCPCS | Mod: HHH

## 2025-06-07 ASSESSMENT — ENCOUNTER SYMPTOMS
DENIES PAIN: 1
APPETITE LEVEL: GOOD
CHANGE IN APPETITE: UNCHANGED
FORGETFULNESS: 1
DESCRIPTION OF MEMORY LOSS: SHORT TERM

## 2025-06-07 ASSESSMENT — ACTIVITIES OF DAILY LIVING (ADL)
AMBULATION ASSISTANCE: STAND BY ASSIST
AMBULATION ASSISTANCE: 1
AMBULATION ASSISTANCE: CONTACT GUARD ASSIST
CURRENT_FUNCTION: STAND BY ASSIST
AMBULATION ASSISTANCE: STAND BY ASSIST
CURRENT_FUNCTION: CONTACT GUARD ASSIST
CURRENT_FUNCTION: STAND BY ASSIST
PHYSICAL TRANSFERS ASSESSED: 1

## 2025-06-10 ENCOUNTER — HOME CARE VISIT (OUTPATIENT)
Dept: HOME HEALTH SERVICES | Facility: HOME HEALTH | Age: 85
End: 2025-06-10
Payer: MEDICARE

## 2025-06-10 ENCOUNTER — TELEPHONE (OUTPATIENT)
Dept: PRIMARY CARE | Facility: CLINIC | Age: 85
End: 2025-06-10
Payer: MEDICARE

## 2025-06-10 VITALS
OXYGEN SATURATION: 98 % | SYSTOLIC BLOOD PRESSURE: 120 MMHG | RESPIRATION RATE: 16 BRPM | HEART RATE: 64 BPM | DIASTOLIC BLOOD PRESSURE: 58 MMHG | TEMPERATURE: 98.2 F

## 2025-06-10 DIAGNOSIS — F03.A0 MILD DEMENTIA, UNSPECIFIED DEMENTIA TYPE, UNSPECIFIED WHETHER BEHAVIORAL, PSYCHOTIC, OR MOOD DISTURBANCE OR ANXIETY: Primary | ICD-10-CM

## 2025-06-10 DIAGNOSIS — M25.512 LEFT SHOULDER PAIN, UNSPECIFIED CHRONICITY: ICD-10-CM

## 2025-06-10 PROCEDURE — G0300 HHS/HOSPICE OF LPN EA 15 MIN: HCPCS | Mod: HHH

## 2025-06-10 RX ORDER — DONEPEZIL HYDROCHLORIDE 10 MG/1
10 TABLET, FILM COATED ORAL NIGHTLY
Qty: 30 TABLET | Refills: 0 | Status: SHIPPED | OUTPATIENT
Start: 2025-06-10 | End: 2025-12-07

## 2025-06-10 RX ORDER — MELOXICAM 7.5 MG/1
7.5 TABLET ORAL DAILY
Qty: 90 TABLET | Refills: 0 | Status: SHIPPED | OUTPATIENT
Start: 2025-06-10 | End: 2025-09-08

## 2025-06-10 ASSESSMENT — ENCOUNTER SYMPTOMS
APPETITE LEVEL: GOOD
DENIES PAIN: 1
CHANGE IN APPETITE: UNCHANGED
FORGETFULNESS: 1
LAST BOWEL MOVEMENT: 67366

## 2025-06-10 NOTE — TELEPHONE ENCOUNTER
Dr. Oli Gonzalez [facility] contact left a voicemail message stating called last week did not get a call back would like for you to call her regarding Barbara prescriptions. Marya contact number is [phone].

## 2025-06-11 ENCOUNTER — HOME CARE VISIT (OUTPATIENT)
Dept: HOME HEALTH SERVICES | Facility: HOME HEALTH | Age: 85
End: 2025-06-11
Payer: MEDICARE

## 2025-06-11 DIAGNOSIS — F03.A0 MILD DEMENTIA, UNSPECIFIED DEMENTIA TYPE, UNSPECIFIED WHETHER BEHAVIORAL, PSYCHOTIC, OR MOOD DISTURBANCE OR ANXIETY: ICD-10-CM

## 2025-06-11 PROCEDURE — G0158 HHC OT ASSISTANT EA 15: HCPCS | Mod: CO,HHH

## 2025-06-12 ENCOUNTER — HOME CARE VISIT (OUTPATIENT)
Dept: HOME HEALTH SERVICES | Facility: HOME HEALTH | Age: 85
End: 2025-06-12
Payer: MEDICARE

## 2025-06-12 VITALS
OXYGEN SATURATION: 98 % | HEART RATE: 62 BPM | SYSTOLIC BLOOD PRESSURE: 115 MMHG | TEMPERATURE: 97.8 F | DIASTOLIC BLOOD PRESSURE: 55 MMHG | RESPIRATION RATE: 16 BRPM

## 2025-06-12 PROCEDURE — G0156 HHCP-SVS OF AIDE,EA 15 MIN: HCPCS | Mod: HHH

## 2025-06-12 PROCEDURE — G0300 HHS/HOSPICE OF LPN EA 15 MIN: HCPCS | Mod: HHH

## 2025-06-12 RX ORDER — DONEPEZIL HYDROCHLORIDE 10 MG/1
10 TABLET, FILM COATED ORAL NIGHTLY
Qty: 90 TABLET | Refills: 1 | OUTPATIENT
Start: 2025-06-12 | End: 2025-12-09

## 2025-06-12 ASSESSMENT — ENCOUNTER SYMPTOMS
FORGETFULNESS: 1
DESCRIPTION OF MEMORY LOSS: LONG TERM
DENIES PAIN: 1
DESCRIPTION OF MEMORY LOSS: SHORT TERM
CHANGE IN APPETITE: UNCHANGED
APPETITE LEVEL: GOOD
LAST BOWEL MOVEMENT: 67366

## 2025-06-14 VITALS — SYSTOLIC BLOOD PRESSURE: 122 MMHG | TEMPERATURE: 97.5 F | DIASTOLIC BLOOD PRESSURE: 65 MMHG

## 2025-06-14 ASSESSMENT — ACTIVITIES OF DAILY LIVING (ADL)
AMBULATION ASSISTANCE: CONTACT GUARD ASSIST
AMBULATION ASSISTANCE: STAND BY ASSIST
AMBULATION ASSISTANCE: 1
CURRENT_FUNCTION: STAND BY ASSIST
CURRENT_FUNCTION: CONTACT GUARD ASSIST
PHYSICAL TRANSFERS ASSESSED: 1

## 2025-06-14 ASSESSMENT — ENCOUNTER SYMPTOMS: DENIES PAIN: 1

## 2025-06-16 ENCOUNTER — HOME CARE VISIT (OUTPATIENT)
Dept: HOME HEALTH SERVICES | Facility: HOME HEALTH | Age: 85
End: 2025-06-16
Payer: MEDICARE

## 2025-06-16 PROCEDURE — G0158 HHC OT ASSISTANT EA 15: HCPCS | Mod: CO,HHH

## 2025-06-18 ENCOUNTER — HOME CARE VISIT (OUTPATIENT)
Dept: HOME HEALTH SERVICES | Facility: HOME HEALTH | Age: 85
End: 2025-06-18
Payer: MEDICARE

## 2025-06-18 VITALS
OXYGEN SATURATION: 98 % | HEART RATE: 59 BPM | SYSTOLIC BLOOD PRESSURE: 110 MMHG | DIASTOLIC BLOOD PRESSURE: 50 MMHG | RESPIRATION RATE: 16 BRPM | TEMPERATURE: 98.7 F

## 2025-06-18 VITALS — DIASTOLIC BLOOD PRESSURE: 55 MMHG | SYSTOLIC BLOOD PRESSURE: 115 MMHG

## 2025-06-18 VITALS
OXYGEN SATURATION: 99 % | HEART RATE: 76 BPM | SYSTOLIC BLOOD PRESSURE: 125 MMHG | TEMPERATURE: 97.6 F | DIASTOLIC BLOOD PRESSURE: 67 MMHG

## 2025-06-18 PROCEDURE — G0300 HHS/HOSPICE OF LPN EA 15 MIN: HCPCS | Mod: HHH

## 2025-06-18 PROCEDURE — G0156 HHCP-SVS OF AIDE,EA 15 MIN: HCPCS | Mod: HHH

## 2025-06-18 PROCEDURE — G0158 HHC OT ASSISTANT EA 15: HCPCS | Mod: CO,HHH

## 2025-06-19 ASSESSMENT — ENCOUNTER SYMPTOMS
CHANGE IN APPETITE: UNCHANGED
FORGETFULNESS: 1
DENIES PAIN: 1
APPETITE LEVEL: GOOD
LAST BOWEL MOVEMENT: 67374

## 2025-06-20 ENCOUNTER — HOME CARE VISIT (OUTPATIENT)
Dept: HOME HEALTH SERVICES | Facility: HOME HEALTH | Age: 85
End: 2025-06-20
Payer: MEDICARE

## 2025-06-20 VITALS
DIASTOLIC BLOOD PRESSURE: 56 MMHG | OXYGEN SATURATION: 95 % | RESPIRATION RATE: 18 BRPM | TEMPERATURE: 98.1 F | SYSTOLIC BLOOD PRESSURE: 118 MMHG | HEART RATE: 76 BPM

## 2025-06-20 PROCEDURE — G0299 HHS/HOSPICE OF RN EA 15 MIN: HCPCS | Mod: HHH

## 2025-06-20 ASSESSMENT — ENCOUNTER SYMPTOMS
CHANGE IN APPETITE: UNCHANGED
BOWEL PATTERN NORMAL: 1
DENIES PAIN: 1
FATIGUE: 1
LAST BOWEL MOVEMENT: 67375
PERSON REPORTING PAIN: PATIENT
FORGETFULNESS: 1
APPETITE LEVEL: GOOD
DESCRIPTION OF MEMORY LOSS: SHORT TERM
STOOL FREQUENCY: DAILY
OCCASIONAL FEELINGS OF UNSTEADINESS: 1
MUSCLE WEAKNESS: 1
DESCRIPTION OF MEMORY LOSS: LONG TERM
DRY SKIN: 1

## 2025-06-20 ASSESSMENT — ACTIVITIES OF DAILY LIVING (ADL)
CURRENT_FUNCTION: ONE PERSON
AMBULATION ASSISTANCE: ONE PERSON
MONEY MANAGEMENT (EXPENSES/BILLS): TOTALLY DEPENDENT

## 2025-06-24 ENCOUNTER — HOME CARE VISIT (OUTPATIENT)
Dept: HOME HEALTH SERVICES | Facility: HOME HEALTH | Age: 85
End: 2025-06-24
Payer: MEDICARE

## 2025-06-24 PROCEDURE — G0158 HHC OT ASSISTANT EA 15: HCPCS | Mod: CO,HHH

## 2025-06-25 ENCOUNTER — HOME CARE VISIT (OUTPATIENT)
Dept: HOME HEALTH SERVICES | Facility: HOME HEALTH | Age: 85
End: 2025-06-25
Payer: MEDICARE

## 2025-06-25 PROCEDURE — G0156 HHCP-SVS OF AIDE,EA 15 MIN: HCPCS | Mod: HHH

## 2025-06-25 PROCEDURE — G0300 HHS/HOSPICE OF LPN EA 15 MIN: HCPCS | Mod: HHH

## 2025-06-25 ASSESSMENT — ENCOUNTER SYMPTOMS
LAST BOWEL MOVEMENT: 67381
APPETITE LEVEL: GOOD
CHANGE IN APPETITE: UNCHANGED
PERSON REPORTING PAIN: PATIENT
DESCRIPTION OF MEMORY LOSS: LONG TERM
DENIES PAIN: 1

## 2025-06-25 ASSESSMENT — ACTIVITIES OF DAILY LIVING (ADL)
AMBULATION ASSISTANCE: STAND BY ASSIST
CURRENT_FUNCTION: STAND BY ASSIST

## 2025-06-26 ENCOUNTER — HOME CARE VISIT (OUTPATIENT)
Dept: HOME HEALTH SERVICES | Facility: HOME HEALTH | Age: 85
End: 2025-06-26
Payer: MEDICARE

## 2025-06-26 VITALS
HEART RATE: 68 BPM | RESPIRATION RATE: 18 BRPM | DIASTOLIC BLOOD PRESSURE: 60 MMHG | TEMPERATURE: 97 F | OXYGEN SATURATION: 96 % | SYSTOLIC BLOOD PRESSURE: 124 MMHG

## 2025-06-26 PROCEDURE — G0152 HHCP-SERV OF OT,EA 15 MIN: HCPCS | Mod: HHH

## 2025-06-26 ASSESSMENT — PAIN SCALES - PAIN ASSESSMENT IN ADVANCED DEMENTIA (PAINAD)
BREATHING: 0
CONSOLABILITY: 0
NEGVOCALIZATION: 0
BODYLANGUAGE: 0 - RELAXED.
BODYLANGUAGE: 0
FACIALEXPRESSION: 0 - SMILING OR INEXPRESSIVE.
FACIALEXPRESSION: 0
NEGVOCALIZATION: 0 - NONE.
TOTALSCORE: 0
CONSOLABILITY: 0 - NO NEED TO CONSOLE.

## 2025-06-26 ASSESSMENT — ACTIVITIES OF DAILY LIVING (ADL)
BATHING ASSESSED: 1
TOILETING: 1
DRESSING_LB_CURRENT_FUNCTION: CONTACT GUARD ASSIST
TOILETING: CONTACT GUARD ASSIST
BATHING_CURRENT_FUNCTION: CONTACT GUARD ASSIST

## 2025-06-26 ASSESSMENT — ENCOUNTER SYMPTOMS
PAIN SEVERITY GOAL: 0/10
PAIN: 1
PERSON REPORTING PAIN: PATIENT
SUBJECTIVE PAIN PROGRESSION: GRADUALLY IMPROVING
LOWEST PAIN SEVERITY IN PAST 24 HOURS: 2/10
HIGHEST PAIN SEVERITY IN PAST 24 HOURS: 4/10

## 2025-06-27 ENCOUNTER — HOME CARE VISIT (OUTPATIENT)
Dept: HOME HEALTH SERVICES | Facility: HOME HEALTH | Age: 85
End: 2025-06-27
Payer: MEDICARE

## 2025-06-27 VITALS
HEART RATE: 70 BPM | DIASTOLIC BLOOD PRESSURE: 67 MMHG | SYSTOLIC BLOOD PRESSURE: 111 MMHG | OXYGEN SATURATION: 98 % | RESPIRATION RATE: 17 BRPM

## 2025-06-27 VITALS
OXYGEN SATURATION: 97 % | TEMPERATURE: 98.6 F | HEART RATE: 66 BPM | DIASTOLIC BLOOD PRESSURE: 62 MMHG | SYSTOLIC BLOOD PRESSURE: 122 MMHG | RESPIRATION RATE: 16 BRPM

## 2025-06-27 PROCEDURE — G0300 HHS/HOSPICE OF LPN EA 15 MIN: HCPCS | Mod: HHH

## 2025-06-27 ASSESSMENT — ACTIVITIES OF DAILY LIVING (ADL)
PHYSICAL TRANSFERS ASSESSED: 1
TOILETING: STAND BY ASSIST
TOILETING: 1
AMBULATION ASSISTANCE: 1
CURRENT_FUNCTION: CONTACT GUARD ASSIST
AMBULATION ASSISTANCE: CONTACT GUARD ASSIST

## 2025-06-28 ASSESSMENT — ACTIVITIES OF DAILY LIVING (ADL): AMBULATION ASSISTANCE: STAND BY ASSIST

## 2025-06-28 ASSESSMENT — ENCOUNTER SYMPTOMS
LAST BOWEL MOVEMENT: 67382
CHANGE IN APPETITE: UNCHANGED
APPETITE LEVEL: GOOD
DENIES PAIN: 1
FORGETFULNESS: 1

## 2025-07-01 ENCOUNTER — HOME CARE VISIT (OUTPATIENT)
Dept: HOME HEALTH SERVICES | Facility: HOME HEALTH | Age: 85
End: 2025-07-01
Payer: MEDICARE

## 2025-07-01 PROCEDURE — G0158 HHC OT ASSISTANT EA 15: HCPCS | Mod: CO,HHH

## 2025-07-01 ASSESSMENT — ENCOUNTER SYMPTOMS
LOWEST PAIN SEVERITY IN PAST 24 HOURS: 0/10
PAIN SEVERITY GOAL: 0/10
HIGHEST PAIN SEVERITY IN PAST 24 HOURS: 0/10
DENIES PAIN: 1

## 2025-07-02 ENCOUNTER — HOME CARE VISIT (OUTPATIENT)
Dept: HOME HEALTH SERVICES | Facility: HOME HEALTH | Age: 85
End: 2025-07-02
Payer: MEDICARE

## 2025-07-02 PROCEDURE — G0156 HHCP-SVS OF AIDE,EA 15 MIN: HCPCS | Mod: HHH

## 2025-07-03 ENCOUNTER — HOME CARE VISIT (OUTPATIENT)
Dept: HOME HEALTH SERVICES | Facility: HOME HEALTH | Age: 85
End: 2025-07-03
Payer: MEDICARE

## 2025-07-03 VITALS
TEMPERATURE: 97.9 F | SYSTOLIC BLOOD PRESSURE: 104 MMHG | RESPIRATION RATE: 18 BRPM | OXYGEN SATURATION: 97 % | DIASTOLIC BLOOD PRESSURE: 60 MMHG | HEART RATE: 64 BPM

## 2025-07-03 PROCEDURE — G0299 HHS/HOSPICE OF RN EA 15 MIN: HCPCS | Mod: HHH

## 2025-07-03 ASSESSMENT — ENCOUNTER SYMPTOMS
LOSS OF SENSATION IN FEET: 0
DEPRESSION: 0
STOOL FREQUENCY: DAILY
DENIES PAIN: 1
DRY SKIN: 1
DESCRIPTION OF MEMORY LOSS: SHORT TERM
TREMORS: 1
FATIGUE: 1
FORGETFULNESS: 1
APPETITE LEVEL: GOOD
BOWEL PATTERN NORMAL: 1
PERSON REPORTING PAIN: PATIENT
LAST BOWEL MOVEMENT: 67388
OCCASIONAL FEELINGS OF UNSTEADINESS: 1

## 2025-07-03 ASSESSMENT — ACTIVITIES OF DAILY LIVING (ADL)
MONEY MANAGEMENT (EXPENSES/BILLS): TOTALLY DEPENDENT
AMBULATION ASSISTANCE: ONE PERSON
AMBULATION ASSISTANCE: 1

## 2025-07-05 ENCOUNTER — HOME CARE VISIT (OUTPATIENT)
Dept: HOME HEALTH SERVICES | Facility: HOME HEALTH | Age: 85
End: 2025-07-05
Payer: MEDICARE

## 2025-07-05 VITALS
SYSTOLIC BLOOD PRESSURE: 132 MMHG | DIASTOLIC BLOOD PRESSURE: 68 MMHG | HEART RATE: 72 BPM | TEMPERATURE: 98 F | RESPIRATION RATE: 18 BRPM | OXYGEN SATURATION: 96 %

## 2025-07-05 ASSESSMENT — ENCOUNTER SYMPTOMS
CHANGE IN APPETITE: UNCHANGED
LAST BOWEL MOVEMENT: 67390
FATIGUE: 1
FORGETFULNESS: 1
STOOL FREQUENCY: LESS THAN DAILY
DEPRESSION: 0
PERSON REPORTING PAIN: PATIENT
DENIES PAIN: 1
DRY SKIN: 1
DESCRIPTION OF MEMORY LOSS: SHORT TERM
DESCRIPTION OF MEMORY LOSS: LONG TERM
LOSS OF SENSATION IN FEET: 0
APPETITE LEVEL: GOOD
BOWEL PATTERN NORMAL: 1
OCCASIONAL FEELINGS OF UNSTEADINESS: 1

## 2025-07-05 ASSESSMENT — ACTIVITIES OF DAILY LIVING (ADL)
AMBULATION ASSISTANCE: ONE PERSON
MONEY MANAGEMENT (EXPENSES/BILLS): TOTALLY DEPENDENT
AMBULATION ASSISTANCE: 1

## 2025-07-07 ENCOUNTER — HOME CARE VISIT (OUTPATIENT)
Dept: HOME HEALTH SERVICES | Facility: HOME HEALTH | Age: 85
End: 2025-07-07
Payer: MEDICARE

## 2025-07-07 PROCEDURE — G0158 HHC OT ASSISTANT EA 15: HCPCS | Mod: CO,HHH

## 2025-07-08 ENCOUNTER — HOME CARE VISIT (OUTPATIENT)
Dept: HOME HEALTH SERVICES | Facility: HOME HEALTH | Age: 85
End: 2025-07-08
Payer: MEDICARE

## 2025-07-08 VITALS
TEMPERATURE: 97.9 F | SYSTOLIC BLOOD PRESSURE: 120 MMHG | RESPIRATION RATE: 16 BRPM | DIASTOLIC BLOOD PRESSURE: 58 MMHG | OXYGEN SATURATION: 97 % | HEART RATE: 74 BPM

## 2025-07-08 PROCEDURE — G0300 HHS/HOSPICE OF LPN EA 15 MIN: HCPCS | Mod: HHH

## 2025-07-08 ASSESSMENT — ACTIVITIES OF DAILY LIVING (ADL): AMBULATION ASSISTANCE: STAND BY ASSIST

## 2025-07-08 ASSESSMENT — ENCOUNTER SYMPTOMS
APPETITE LEVEL: GOOD
FORGETFULNESS: 1
DENIES PAIN: 1
CHANGE IN APPETITE: UNCHANGED
LAST BOWEL MOVEMENT: 67394

## 2025-07-09 ENCOUNTER — HOME CARE VISIT (OUTPATIENT)
Dept: HOME HEALTH SERVICES | Facility: HOME HEALTH | Age: 85
End: 2025-07-09
Payer: MEDICARE

## 2025-07-09 VITALS — OXYGEN SATURATION: 99 % | SYSTOLIC BLOOD PRESSURE: 115 MMHG | HEART RATE: 70 BPM | DIASTOLIC BLOOD PRESSURE: 48 MMHG

## 2025-07-09 PROCEDURE — G0156 HHCP-SVS OF AIDE,EA 15 MIN: HCPCS | Mod: HHH

## 2025-07-09 ASSESSMENT — ENCOUNTER SYMPTOMS: DENIES PAIN: 1

## 2025-07-10 ENCOUNTER — HOME CARE VISIT (OUTPATIENT)
Dept: HOME HEALTH SERVICES | Facility: HOME HEALTH | Age: 85
End: 2025-07-10
Payer: MEDICARE

## 2025-07-10 VITALS
SYSTOLIC BLOOD PRESSURE: 122 MMHG | OXYGEN SATURATION: 98 % | DIASTOLIC BLOOD PRESSURE: 60 MMHG | RESPIRATION RATE: 16 BRPM | TEMPERATURE: 98.8 F | HEART RATE: 64 BPM

## 2025-07-10 PROCEDURE — G0300 HHS/HOSPICE OF LPN EA 15 MIN: HCPCS | Mod: HHH

## 2025-07-11 ASSESSMENT — ENCOUNTER SYMPTOMS
LAST BOWEL MOVEMENT: 67396
FORGETFULNESS: 1
DENIES PAIN: 1
CHANGE IN APPETITE: UNCHANGED
APPETITE LEVEL: GOOD

## 2025-07-14 DIAGNOSIS — F03.A0 MILD DEMENTIA, UNSPECIFIED DEMENTIA TYPE, UNSPECIFIED WHETHER BEHAVIORAL, PSYCHOTIC, OR MOOD DISTURBANCE OR ANXIETY: ICD-10-CM

## 2025-07-15 ENCOUNTER — HOME CARE VISIT (OUTPATIENT)
Dept: HOME HEALTH SERVICES | Facility: HOME HEALTH | Age: 85
End: 2025-07-15
Payer: MEDICARE

## 2025-07-15 VITALS
OXYGEN SATURATION: 97 % | HEART RATE: 70 BPM | SYSTOLIC BLOOD PRESSURE: 116 MMHG | RESPIRATION RATE: 16 BRPM | TEMPERATURE: 98.7 F | DIASTOLIC BLOOD PRESSURE: 58 MMHG

## 2025-07-15 VITALS — HEART RATE: 76 BPM | SYSTOLIC BLOOD PRESSURE: 130 MMHG | OXYGEN SATURATION: 99 % | DIASTOLIC BLOOD PRESSURE: 76 MMHG

## 2025-07-15 DIAGNOSIS — I10 HYPERTENSION, UNSPECIFIED TYPE: Primary | ICD-10-CM

## 2025-07-15 PROCEDURE — G0156 HHCP-SVS OF AIDE,EA 15 MIN: HCPCS | Mod: HHH

## 2025-07-15 PROCEDURE — G0158 HHC OT ASSISTANT EA 15: HCPCS | Mod: CO,HHH

## 2025-07-15 PROCEDURE — G0300 HHS/HOSPICE OF LPN EA 15 MIN: HCPCS | Mod: HHH

## 2025-07-15 RX ORDER — DONEPEZIL HYDROCHLORIDE 10 MG/1
10 TABLET, FILM COATED ORAL NIGHTLY
Qty: 30 TABLET | Refills: 2 | Status: SHIPPED | OUTPATIENT
Start: 2025-07-15 | End: 2026-01-11

## 2025-07-15 RX ORDER — PROPRANOLOL HYDROCHLORIDE 20 MG/1
20 TABLET ORAL 2 TIMES DAILY
Qty: 60 TABLET | Refills: 1 | OUTPATIENT
Start: 2025-07-15

## 2025-07-15 ASSESSMENT — ENCOUNTER SYMPTOMS
FORGETFULNESS: 1
DESCRIPTION OF MEMORY LOSS: SHORT TERM
SUBJECTIVE PAIN PROGRESSION: UNCHANGED
HIGHEST PAIN SEVERITY IN PAST 24 HOURS: 0/10
PAIN SEVERITY GOAL: 0/10
DENIES PAIN: 1
LAST BOWEL MOVEMENT: 67401
CHANGE IN APPETITE: UNCHANGED
DESCRIPTION OF MEMORY LOSS: IMMEDIATE
DENIES PAIN: 1
LOWEST PAIN SEVERITY IN PAST 24 HOURS: 0/10
APPETITE LEVEL: GOOD

## 2025-07-15 ASSESSMENT — ACTIVITIES OF DAILY LIVING (ADL)
CURRENT_FUNCTION: STAND BY ASSIST
DRESSING_LB_CURRENT_FUNCTION: CONTACT GUARD ASSIST
AMBULATION ASSISTANCE: SUPERVISION
BATHING_CURRENT_FUNCTION: MINIMUM ASSIST
AMBULATION ASSISTANCE: STAND BY ASSIST
BATHING ASSESSED: 1
DRESSING_UB_CURRENT_FUNCTION: SUPERVISION
AMBULATION ASSISTANCE: 1
DRESSING_LB_CURRENT_FUNCTION: MINIMUM ASSIST

## 2025-07-15 NOTE — TELEPHONE ENCOUNTER
Coleen is calling to speak with the doctor regarding the patient BP she had to adjust her propranolol.

## 2025-07-16 ASSESSMENT — ENCOUNTER SYMPTOMS: SLEEP QUALITY: FAIR

## 2025-07-18 ENCOUNTER — HOME CARE VISIT (OUTPATIENT)
Dept: HOME HEALTH SERVICES | Facility: HOME HEALTH | Age: 85
End: 2025-07-18
Payer: MEDICARE

## 2025-07-18 VITALS
OXYGEN SATURATION: 95 % | RESPIRATION RATE: 20 BRPM | HEART RATE: 68 BPM | TEMPERATURE: 98.4 F | SYSTOLIC BLOOD PRESSURE: 116 MMHG | DIASTOLIC BLOOD PRESSURE: 58 MMHG

## 2025-07-18 PROCEDURE — G0299 HHS/HOSPICE OF RN EA 15 MIN: HCPCS | Mod: HHH

## 2025-07-18 ASSESSMENT — ENCOUNTER SYMPTOMS
TREMORS: 1
OCCASIONAL FEELINGS OF UNSTEADINESS: 1
LAST BOWEL MOVEMENT: 67403
DESCRIPTION OF MEMORY LOSS: SHORT TERM
FORGETFULNESS: 1
MUSCLE WEAKNESS: 1
PERSON REPORTING PAIN: PATIENT
DRY SKIN: 1
BOWEL PATTERN NORMAL: 1
DEPRESSION: 0
CHANGE IN APPETITE: UNCHANGED
FATIGUE: 1
LOSS OF SENSATION IN FEET: 0
APPETITE LEVEL: GOOD
DESCRIPTION OF MEMORY LOSS: LONG TERM
DENIES PAIN: 1
AGITATION: 1
STOOL FREQUENCY: LESS THAN DAILY

## 2025-07-18 ASSESSMENT — ACTIVITIES OF DAILY LIVING (ADL)
AMBULATION ASSISTANCE: ONE PERSON
CURRENT_FUNCTION: ONE PERSON
MONEY MANAGEMENT (EXPENSES/BILLS): TOTALLY DEPENDENT

## 2025-07-22 ENCOUNTER — HOME CARE VISIT (OUTPATIENT)
Dept: HOME HEALTH SERVICES | Facility: HOME HEALTH | Age: 85
End: 2025-07-22
Payer: MEDICARE

## 2025-07-22 VITALS
TEMPERATURE: 98.2 F | DIASTOLIC BLOOD PRESSURE: 62 MMHG | RESPIRATION RATE: 16 BRPM | OXYGEN SATURATION: 96 % | HEART RATE: 64 BPM | SYSTOLIC BLOOD PRESSURE: 120 MMHG

## 2025-07-22 PROCEDURE — G0300 HHS/HOSPICE OF LPN EA 15 MIN: HCPCS | Mod: HHH

## 2025-07-22 PROCEDURE — G0156 HHCP-SVS OF AIDE,EA 15 MIN: HCPCS | Mod: HHH

## 2025-07-22 ASSESSMENT — ACTIVITIES OF DAILY LIVING (ADL)
AMBULATION ASSISTANCE: STAND BY ASSIST
CURRENT_FUNCTION: STAND BY ASSIST

## 2025-07-22 ASSESSMENT — ENCOUNTER SYMPTOMS
DENIES PAIN: 1
FORGETFULNESS: 1
DESCRIPTION OF MEMORY LOSS: LONG TERM
APPETITE LEVEL: GOOD
LAST BOWEL MOVEMENT: 67407
DESCRIPTION OF MEMORY LOSS: SHORT TERM
CHANGE IN APPETITE: UNCHANGED

## 2025-07-25 ENCOUNTER — HOME CARE VISIT (OUTPATIENT)
Dept: HOME HEALTH SERVICES | Facility: HOME HEALTH | Age: 85
End: 2025-07-25
Payer: MEDICARE

## 2025-07-25 VITALS
OXYGEN SATURATION: 98 % | SYSTOLIC BLOOD PRESSURE: 132 MMHG | RESPIRATION RATE: 16 BRPM | TEMPERATURE: 97.8 F | DIASTOLIC BLOOD PRESSURE: 62 MMHG | HEART RATE: 58 BPM

## 2025-07-25 PROCEDURE — G0300 HHS/HOSPICE OF LPN EA 15 MIN: HCPCS | Mod: HHH

## 2025-07-25 ASSESSMENT — ENCOUNTER SYMPTOMS
CHANGE IN APPETITE: UNCHANGED
APPETITE LEVEL: GOOD
DENIES PAIN: 1
FORGETFULNESS: 1
LAST BOWEL MOVEMENT: 67410

## 2025-07-28 ENCOUNTER — HOME CARE VISIT (OUTPATIENT)
Dept: HOME HEALTH SERVICES | Facility: HOME HEALTH | Age: 85
End: 2025-07-28
Payer: MEDICARE

## 2025-07-28 VITALS
DIASTOLIC BLOOD PRESSURE: 60 MMHG | HEART RATE: 84 BPM | OXYGEN SATURATION: 96 % | SYSTOLIC BLOOD PRESSURE: 110 MMHG | TEMPERATURE: 97.8 F | RESPIRATION RATE: 20 BRPM

## 2025-07-28 DIAGNOSIS — I10 HYPERTENSION, UNSPECIFIED TYPE: Primary | ICD-10-CM

## 2025-07-28 PROCEDURE — G0299 HHS/HOSPICE OF RN EA 15 MIN: HCPCS | Mod: HHH

## 2025-07-28 RX ORDER — PROPRANOLOL HYDROCHLORIDE 20 MG/1
10 TABLET ORAL DAILY
Qty: 60 TABLET | Refills: 1 | Status: CANCELLED | OUTPATIENT
Start: 2025-07-28

## 2025-07-28 ASSESSMENT — ENCOUNTER SYMPTOMS
APPETITE LEVEL: FAIR
LAST BOWEL MOVEMENT: 67413
DESCRIPTION OF MEMORY LOSS: LONG TERM
DESCRIPTION OF MEMORY LOSS: SHORT TERM
LIMITED RANGE OF MOTION: 1
FATIGUES EASILY: 1
MUSCLE WEAKNESS: 1
DENIES PAIN: 1
PERSON REPORTING PAIN: CAREGIVER
CHANGE IN APPETITE: UNCHANGED
SHORTNESS OF BREATH: 1
STOOL FREQUENCY: LESS THAN DAILY
DESCRIPTION OF MEMORY LOSS: IMMEDIATE
SKIN LESIONS: 1
DYSPNEA ACTIVITY LEVEL: AT REST

## 2025-07-30 ENCOUNTER — HOME CARE VISIT (OUTPATIENT)
Dept: HOME HEALTH SERVICES | Facility: HOME HEALTH | Age: 85
End: 2025-07-30
Payer: MEDICARE

## 2025-07-30 PROCEDURE — G0156 HHCP-SVS OF AIDE,EA 15 MIN: HCPCS | Mod: HHH

## 2025-07-30 RX ORDER — PROPRANOLOL HYDROCHLORIDE 20 MG/1
10 TABLET ORAL DAILY
Qty: 60 TABLET | Refills: 1 | Status: SHIPPED | OUTPATIENT
Start: 2025-07-30

## 2025-07-31 ENCOUNTER — HOME CARE VISIT (OUTPATIENT)
Dept: HOME HEALTH SERVICES | Facility: HOME HEALTH | Age: 85
End: 2025-07-31
Payer: MEDICARE

## 2025-07-31 VITALS
TEMPERATURE: 97.2 F | DIASTOLIC BLOOD PRESSURE: 68 MMHG | OXYGEN SATURATION: 97 % | SYSTOLIC BLOOD PRESSURE: 122 MMHG | HEART RATE: 64 BPM

## 2025-07-31 PROCEDURE — G0300 HHS/HOSPICE OF LPN EA 15 MIN: HCPCS | Mod: HHH

## 2025-07-31 ASSESSMENT — ENCOUNTER SYMPTOMS
LAST BOWEL MOVEMENT: 67410
APPETITE LEVEL: GOOD
FORGETFULNESS: 1
DENIES PAIN: 1
CHANGE IN APPETITE: UNCHANGED

## 2025-08-04 ENCOUNTER — HOME CARE VISIT (OUTPATIENT)
Dept: HOME HEALTH SERVICES | Facility: HOME HEALTH | Age: 85
End: 2025-08-04

## 2025-08-04 ASSESSMENT — ACTIVITIES OF DAILY LIVING (ADL)
FEEDING: STAND BY ASSIST
DRESSING_LB_CURRENT_FUNCTION: MINIMUM ASSIST
CURRENT_FUNCTION: MODERATE ASSIST
TOILETING: 1
OASIS_M1830: 03
AMBULATION ASSISTANCE: STAND BY ASSIST
PREPARING MEALS: DEPENDENT
TELEPHONE USE ASSESSED: 1
AMBULATION ASSISTANCE: 1
TOILETING: MINIMUM ASSIST
DRESSING_UB_CURRENT_FUNCTION: MINIMUM ASSIST
ORAL_CARE_ASSESSED: 1
PHYSICAL TRANSFERS ASSESSED: 1
BATHING_CURRENT_FUNCTION: MODERATE ASSIST
USING THE TELPHONE: DEPENDENT
LIGHT HOUSEKEEPING: DEPENDENT
HOUSEKEEPING ASSESSED: 1
LAUNDRY: DEPENDENT
TRANSPORTATION ASSESSED: 1
ORAL_CARE_CURRENT_FUNCTION: NEEDS ASSISTANCE
LAUNDRY ASSESSED: 1
BATHING ASSESSED: 1
FEEDING ASSESSED: 1
HOME_HEALTH_OASIS: 01
BATHING_CURRENT_FUNCTION: ONE PERSON
SHOPPING: DEPENDENT
GROOMING_CURRENT_FUNCTION: MODERATE ASSIST
SHOPPING ASSESSED: 1
TRANSPORTATION: DEPENDENT
GROOMING ASSESSED: 1

## 2025-08-04 ASSESSMENT — ENCOUNTER SYMPTOMS
PERSON REPORTING PAIN: CAREGIVER
DENIES PAIN: 1

## 2025-08-13 ENCOUNTER — TELEPHONE (OUTPATIENT)
Dept: PRIMARY CARE | Facility: CLINIC | Age: 85
End: 2025-08-13
Payer: MEDICARE

## 2025-10-09 ENCOUNTER — APPOINTMENT (OUTPATIENT)
Dept: PRIMARY CARE | Facility: CLINIC | Age: 85
End: 2025-10-09
Payer: MEDICARE

## 2025-11-20 ENCOUNTER — APPOINTMENT (OUTPATIENT)
Dept: PRIMARY CARE | Facility: CLINIC | Age: 85
End: 2025-11-20
Payer: MEDICARE